# Patient Record
Sex: MALE | Race: WHITE | NOT HISPANIC OR LATINO | Employment: PART TIME | URBAN - METROPOLITAN AREA
[De-identification: names, ages, dates, MRNs, and addresses within clinical notes are randomized per-mention and may not be internally consistent; named-entity substitution may affect disease eponyms.]

---

## 2017-01-13 DIAGNOSIS — Z00.00 PREVENTATIVE HEALTH CARE: ICD-10-CM

## 2017-02-12 DIAGNOSIS — R79.89 LOW TESTOSTERONE LEVEL IN MALE: ICD-10-CM

## 2018-04-13 ENCOUNTER — OFFICE VISIT (OUTPATIENT)
Dept: MEDICAL GROUP | Facility: MEDICAL CENTER | Age: 43
End: 2018-04-13

## 2018-04-13 VITALS
DIASTOLIC BLOOD PRESSURE: 88 MMHG | HEIGHT: 68 IN | OXYGEN SATURATION: 95 % | WEIGHT: 234 LBS | TEMPERATURE: 98.4 F | BODY MASS INDEX: 35.46 KG/M2 | HEART RATE: 96 BPM | SYSTOLIC BLOOD PRESSURE: 122 MMHG

## 2018-04-13 DIAGNOSIS — E66.9 OBESITY (BMI 35.0-39.9 WITHOUT COMORBIDITY): ICD-10-CM

## 2018-04-13 DIAGNOSIS — H93.12 TINNITUS OF LEFT EAR: ICD-10-CM

## 2018-04-13 PROCEDURE — 99213 OFFICE O/P EST LOW 20 MIN: CPT | Performed by: PHYSICIAN ASSISTANT

## 2018-04-13 ASSESSMENT — PATIENT HEALTH QUESTIONNAIRE - PHQ9: CLINICAL INTERPRETATION OF PHQ2 SCORE: 0

## 2018-04-13 NOTE — ASSESSMENT & PLAN NOTE
This is a 42 old male complains of a one-week history of left ear ringing. States that he was in [place] and he turned over in bed and the ringing developed. Has had issues with hearing. Denies any vertigo. Denies any dizziness. Has had some slight nasal congestion. Since Friday he has been taking an antihistamine and a nasal decongestant. Also took Mucinex. Symptoms are not improving. He states for his job he needs to hear.

## 2018-04-13 NOTE — PROGRESS NOTES
"Subjective:   Antonio Hutson is a 42 y.o. male here today for left ear ringing.    Tinnitus of left ear  This is a 42 old male complains of a one-week history of left ear ringing. States that he was in [place] and he turned over in bed and the ringing developed. Has had issues with hearing. Denies any vertigo. Denies any dizziness. Has had some slight nasal congestion. Since Friday he has been taking an antihistamine and a nasal decongestant. Also took Mucinex. Symptoms are not improving. He states for his job he needs to hear.       Current medicines (including changes today)  Current Outpatient Prescriptions   Medication Sig Dispense Refill   • fexofenadine (ALLEGRA) 180 MG tablet Take 1 Tab by mouth every day. 30 Tab 3   • esomeprazole (NEXIUM) 20 MG capsule Take 1 Cap by mouth every day. 1/2 HOUR PRIOR TO SAME MEAL DAILY 30 Cap 5     No current facility-administered medications for this visit.      He  has no past medical history on file.    Social History and Family History were reviewed and updated.    ROS   No chest pain, no shortness of breath, no abdominal pain and all other systems were reviewed and are negative.       Objective:     Blood pressure 142/100, pulse 96, temperature 36.9 °C (98.4 °F), height 1.727 m (5' 8\"), weight 106.1 kg (234 lb), SpO2 95 %. Body mass index is 35.58 kg/m².   Physical Exam:  Constitutional: Alert, no distress.  Skin: Warm, dry, good turgor, no rashes in visible areas.  Eye: Equal, round and reactive, conjunctiva clear, lids normal.  ENMT: Lips without lesions, good dentition, oropharynx clear. Bilateral TMs are clear. Erythematous lesion noticed in the canal of the left ear.   Neck: Trachea midline, no masses.   Lymph: No cervical or supraclavicular lymphadenopathy  Respiratory: Unlabored respiratory effort, lungs clear to auscultation, no wheezes, no ronchi.  Cardiovascular: Normal S1, S2, no murmur, no edema.  Abdomen: Soft, non-tender, no masses.  Psych: Alert and " oriented x3, normal affect and mood.        Assessment and Plan:   The following treatment plan was discussed    1. Tinnitus of left ear  Acute, new onset condition. Symptoms likely secondary to ear congestion. Continue antihistamine. Perform Valsalva maneuver. Advised to stop using Q-tips. Refer to ENT in case symptoms do not improve.  - REFERRAL TO ENT    2. Obesity (BMI 35.0-39.9 without comorbidity)  Urged to exercise routinely and eat healthier. He will start the keto diet soon.  - Patient identified as having weight management issue.  Appropriate orders and counseling given.      Followup: Return if symptoms worsen or fail to improve.    Please note that this dictation was created using voice recognition software. I have made every reasonable attempt to correct obvious errors, but I expect that there are errors of grammar and possibly content that I did not discover before finalizing the note.

## 2018-12-07 ENCOUNTER — OFFICE VISIT (OUTPATIENT)
Dept: MEDICAL GROUP | Facility: MEDICAL CENTER | Age: 43
End: 2018-12-07
Payer: COMMERCIAL

## 2018-12-07 VITALS
HEIGHT: 68 IN | DIASTOLIC BLOOD PRESSURE: 82 MMHG | SYSTOLIC BLOOD PRESSURE: 134 MMHG | TEMPERATURE: 97.5 F | BODY MASS INDEX: 35.01 KG/M2 | HEART RATE: 91 BPM | WEIGHT: 231 LBS | OXYGEN SATURATION: 96 %

## 2018-12-07 DIAGNOSIS — J06.9 UPPER RESPIRATORY TRACT INFECTION, UNSPECIFIED TYPE: ICD-10-CM

## 2018-12-07 LAB
FLUAV+FLUBV AG SPEC QL IA: NEGATIVE
INT CON NEG: NEGATIVE
INT CON POS: POSITIVE

## 2018-12-07 PROCEDURE — 99214 OFFICE O/P EST MOD 30 MIN: CPT | Performed by: FAMILY MEDICINE

## 2018-12-07 PROCEDURE — 87804 INFLUENZA ASSAY W/OPTIC: CPT | Performed by: FAMILY MEDICINE

## 2018-12-07 NOTE — PROGRESS NOTES
"Patient normally sees Bhupendra Hurtado PA-C.  This is the first time I am seeing him.    HPI: 3 days of illness including: nasal congestion, rhinorrhea, sore throat, facial pain, cough, dyspnea, diarrhea, nausea, body aches, fatigue. Mucus is: green  Symptoms negative for fever, chills, vomiting   Similarly ill exposures: yes, co-worker was sick, believes one co-worker had pneumonia.  Medical history negative for recurrent OM, tonsillitis, sinusitis, asthma, bronchitis, pneumonia.   He  reports that he has never smoked. He has never used smokeless tobacco.  Taking Mucinex-DM, 12 hour nasal spray.  Yesterday was the worst.    ROS  No fever. + productive cough. No skin rashes.      Blood pressure 134/82, pulse 91, temperature 36.4 °C (97.5 °F), height 1.727 m (5' 8\"), weight 104.8 kg (231 lb), SpO2 96 %.  Gen: alert, No conversational dyspnea. No audible wheeze, nontoxic.  PERRL, conjunctiva slightly injected. No photophobia. No eye discharge.  Ears: normal pinnae. TM: normal  Nares patent with thick mucus.  Sinuses tender over maxillary / frontal sinuses  Throat: erythematous injection. No exudate.   Neck: supple, with  mildly enlarged cervical nodes  Lungs:  clear to auscultation, no wheezing, no retraction, no stridor, good air exchange.   Abdomen soft. No HSM.   Skin: warm and dry. No rash.    A/P    1. Upper respiratory tract infection, unspecified type  Negative influenza in clinic today.  Symptoms are most likely viral.  Discussed over-the-counter nasal decongestants for 3-5 days, discussed rebound congestion with long-term use.  Discussed rest and hydration.  If no improvement after 10 days, patient is to call for a prescription for amoxicillin.    Followup for worsening symptoms, difficulty breathing, lack of expected recovery, or should new symptoms or problems arise.    "

## 2018-12-13 ENCOUNTER — APPOINTMENT (OUTPATIENT)
Dept: MEDICAL GROUP | Facility: MEDICAL CENTER | Age: 43
End: 2018-12-13
Payer: COMMERCIAL

## 2019-03-18 ENCOUNTER — HOSPITAL ENCOUNTER (EMERGENCY)
Facility: MEDICAL CENTER | Age: 44
End: 2019-03-18
Attending: EMERGENCY MEDICINE
Payer: COMMERCIAL

## 2019-03-18 VITALS
HEIGHT: 68 IN | TEMPERATURE: 98.4 F | RESPIRATION RATE: 18 BRPM | OXYGEN SATURATION: 94 % | HEART RATE: 95 BPM | DIASTOLIC BLOOD PRESSURE: 94 MMHG | WEIGHT: 232.81 LBS | SYSTOLIC BLOOD PRESSURE: 127 MMHG | BODY MASS INDEX: 35.28 KG/M2

## 2019-03-18 DIAGNOSIS — R11.10 VOMITING AND DIARRHEA: ICD-10-CM

## 2019-03-18 DIAGNOSIS — R19.7 VOMITING AND DIARRHEA: ICD-10-CM

## 2019-03-18 LAB
ALBUMIN SERPL BCP-MCNC: 4.7 G/DL (ref 3.2–4.9)
ALBUMIN/GLOB SERPL: 1.4 G/DL
ALP SERPL-CCNC: 89 U/L (ref 30–99)
ALT SERPL-CCNC: 54 U/L (ref 2–50)
ANION GAP SERPL CALC-SCNC: 10 MMOL/L (ref 0–11.9)
APPEARANCE UR: CLEAR
AST SERPL-CCNC: 23 U/L (ref 12–45)
BASOPHILS # BLD AUTO: 0.2 % (ref 0–1.8)
BASOPHILS # BLD: 0.04 K/UL (ref 0–0.12)
BILIRUB SERPL-MCNC: 0.8 MG/DL (ref 0.1–1.5)
BILIRUB UR QL STRIP.AUTO: NEGATIVE
BUN SERPL-MCNC: 15 MG/DL (ref 8–22)
CALCIUM SERPL-MCNC: 9.2 MG/DL (ref 8.4–10.2)
CHLORIDE SERPL-SCNC: 99 MMOL/L (ref 96–112)
CO2 SERPL-SCNC: 25 MMOL/L (ref 20–33)
COLOR UR: YELLOW
CREAT SERPL-MCNC: 1.03 MG/DL (ref 0.5–1.4)
EOSINOPHIL # BLD AUTO: 0.03 K/UL (ref 0–0.51)
EOSINOPHIL NFR BLD: 0.2 % (ref 0–6.9)
ERYTHROCYTE [DISTWIDTH] IN BLOOD BY AUTOMATED COUNT: 39.5 FL (ref 35.9–50)
GLOBULIN SER CALC-MCNC: 3.3 G/DL (ref 1.9–3.5)
GLUCOSE SERPL-MCNC: 114 MG/DL (ref 65–99)
GLUCOSE UR STRIP.AUTO-MCNC: NEGATIVE MG/DL
HCT VFR BLD AUTO: 50 % (ref 42–52)
HGB BLD-MCNC: 16.6 G/DL (ref 14–18)
IMM GRANULOCYTES # BLD AUTO: 0.11 K/UL (ref 0–0.11)
IMM GRANULOCYTES NFR BLD AUTO: 0.6 % (ref 0–0.9)
KETONES UR STRIP.AUTO-MCNC: >=80 MG/DL
LEUKOCYTE ESTERASE UR QL STRIP.AUTO: NEGATIVE
LIPASE SERPL-CCNC: 27 U/L (ref 7–58)
LYMPHOCYTES # BLD AUTO: 0.28 K/UL (ref 1–4.8)
LYMPHOCYTES NFR BLD: 1.6 % (ref 22–41)
MCH RBC QN AUTO: 28.4 PG (ref 27–33)
MCHC RBC AUTO-ENTMCNC: 33.2 G/DL (ref 33.7–35.3)
MCV RBC AUTO: 85.6 FL (ref 81.4–97.8)
MICRO URNS: ABNORMAL
MONOCYTES # BLD AUTO: 0.36 K/UL (ref 0–0.85)
MONOCYTES NFR BLD AUTO: 2 % (ref 0–13.4)
NEUTROPHILS # BLD AUTO: 16.89 K/UL (ref 1.82–7.42)
NEUTROPHILS NFR BLD: 95.4 % (ref 44–72)
NITRITE UR QL STRIP.AUTO: NEGATIVE
NRBC # BLD AUTO: 0 K/UL
NRBC BLD-RTO: 0 /100 WBC
PH UR STRIP.AUTO: 5.5 [PH]
PLATELET # BLD AUTO: 282 K/UL (ref 164–446)
PMV BLD AUTO: 10 FL (ref 9–12.9)
POTASSIUM SERPL-SCNC: 4.3 MMOL/L (ref 3.6–5.5)
PROT SERPL-MCNC: 8 G/DL (ref 6–8.2)
PROT UR QL STRIP: NEGATIVE MG/DL
RBC # BLD AUTO: 5.84 M/UL (ref 4.7–6.1)
RBC UR QL AUTO: NEGATIVE
SODIUM SERPL-SCNC: 134 MMOL/L (ref 135–145)
SP GR UR REFRACTOMETRY: 1.03
WBC # BLD AUTO: 17.7 K/UL (ref 4.8–10.8)

## 2019-03-18 PROCEDURE — 96374 THER/PROPH/DIAG INJ IV PUSH: CPT

## 2019-03-18 PROCEDURE — 80053 COMPREHEN METABOLIC PANEL: CPT

## 2019-03-18 PROCEDURE — 81003 URINALYSIS AUTO W/O SCOPE: CPT

## 2019-03-18 PROCEDURE — 85025 COMPLETE CBC W/AUTO DIFF WBC: CPT

## 2019-03-18 PROCEDURE — 700105 HCHG RX REV CODE 258: Performed by: EMERGENCY MEDICINE

## 2019-03-18 PROCEDURE — 99285 EMERGENCY DEPT VISIT HI MDM: CPT

## 2019-03-18 PROCEDURE — 36415 COLL VENOUS BLD VENIPUNCTURE: CPT

## 2019-03-18 PROCEDURE — 83690 ASSAY OF LIPASE: CPT

## 2019-03-18 PROCEDURE — 700111 HCHG RX REV CODE 636 W/ 250 OVERRIDE (IP)

## 2019-03-18 PROCEDURE — 700111 HCHG RX REV CODE 636 W/ 250 OVERRIDE (IP): Performed by: EMERGENCY MEDICINE

## 2019-03-18 RX ORDER — ONDANSETRON 2 MG/ML
4 INJECTION INTRAMUSCULAR; INTRAVENOUS ONCE
Status: COMPLETED | OUTPATIENT
Start: 2019-03-18 | End: 2019-03-18

## 2019-03-18 RX ORDER — KETOROLAC TROMETHAMINE 30 MG/ML
30 INJECTION, SOLUTION INTRAMUSCULAR; INTRAVENOUS ONCE
Status: COMPLETED | OUTPATIENT
Start: 2019-03-18 | End: 2019-03-18

## 2019-03-18 RX ORDER — SODIUM CHLORIDE 9 MG/ML
1000 INJECTION, SOLUTION INTRAVENOUS ONCE
Status: COMPLETED | OUTPATIENT
Start: 2019-03-18 | End: 2019-03-18

## 2019-03-18 RX ORDER — ONDANSETRON 4 MG/1
4 TABLET, ORALLY DISINTEGRATING ORAL EVERY 8 HOURS PRN
Qty: 20 TAB | Refills: 0 | Status: SHIPPED | OUTPATIENT
Start: 2019-03-18 | End: 2022-07-10

## 2019-03-18 RX ORDER — KETOROLAC TROMETHAMINE 30 MG/ML
INJECTION, SOLUTION INTRAMUSCULAR; INTRAVENOUS
Status: COMPLETED
Start: 2019-03-18 | End: 2019-03-18

## 2019-03-18 RX ADMIN — KETOROLAC TROMETHAMINE 30 MG: 30 INJECTION, SOLUTION INTRAMUSCULAR; INTRAVENOUS at 21:22

## 2019-03-18 RX ADMIN — SODIUM CHLORIDE 1000 ML: 9 INJECTION, SOLUTION INTRAVENOUS at 21:23

## 2019-03-18 RX ADMIN — ONDANSETRON 4 MG: 2 INJECTION INTRAMUSCULAR; INTRAVENOUS at 21:23

## 2019-03-18 NOTE — ED TRIAGE NOTES
Patient BiB wife for generalized ABD pain and n/v/d since this morning. Patient describes pain as burning. He's taken his nexium as well as pepto bismal without any relief.

## 2019-03-19 NOTE — ED NOTES
Rounded on patient.  Patient was eating some bites of a sand which.  Advised to refrain from eating until ERP clears PO intake.

## 2019-03-19 NOTE — ED PROVIDER NOTES
"ED Provider Note    CHIEF COMPLAINT  Chief Complaint   Patient presents with   • Nausea/Vomiting/Diarrhea         HPI  Antonio Hutson is a 43 y.o. male who presents to the ED [facility] nausea vomiting diarrhea.  The patient, no black stools.  The patient does have diffuse pain throughout the body myalgias no fevers.  Does have a burning sensation in his stomach when he vomits.  Nonspecific abdominal pain.  No sick contacts, no new or different foods, no foreign travel.  No runny nose, cough, congestion.  The patient had a similar episode of nausea vomiting diarrhea last week and it went away after 3 hours.    REVIEW OF SYSTEMS  See HPI for further details. All other systems are negative.     PAST MEDICAL HISTORY  Past Medical History:   Diagnosis Date   • GERD (gastroesophageal reflux disease)        FAMILY HISTORY  No family history on file.    SOCIAL HISTORY  Social History     Social History   • Marital status: Single     Spouse name: N/A   • Number of children: N/A   • Years of education: N/A     Social History Main Topics   • Smoking status: Never Smoker   • Smokeless tobacco: Never Used   • Alcohol use Yes      Comment: occ   • Drug use: No   • Sexual activity: Yes     Partners: Female     Birth control/ protection: Surgical     Other Topics Concern   • Not on file     Social History Narrative   • No narrative on file       SURGICAL HISTORY  No past surgical history on file.    CURRENT MEDICATIONS  Home Medications    **Home medications have not yet been reviewed for this encounter**         ALLERGIES  Allergies   Allergen Reactions   • Nkda [No Known Drug Allergy]        PHYSICAL EXAM  VITAL SIGNS: /94   Pulse 95   Temp 36.9 °C (98.4 °F) (Temporal)   Resp 18   Ht 1.727 m (5' 8\")   Wt 105.6 kg (232 lb 12.9 oz)   SpO2 94%   BMI 35.40 kg/m²   Constitutional: Well developed, Well nourished, mild distress, Non-toxic appearance.   HENT: Normocephalic, Atraumatic, Bilateral external ears normal, " Oropharynx dry, No oral exudates, Nose normal.   Eyes: PERRLA, EOMI, Conjunctiva normal, No discharge.   Neck: Normal range of motion, No tenderness, Supple, No stridor.   Lymphatic: No lymphadenopathy noted.   Cardiovascular: Normal heart rate, Normal rhythm.   Thorax & Lungs: Normal breath sounds, No respiratory distress, No wheezing, No chest tenderness.   Abdomen: Obese, soft, nontender, no rebound, no peritoneal signs  Skin: Warm, Dry, No erythema, No rash.   Back: No tenderness, No CVA tenderness.   Extremities: Intact distal pulses, No edema, No tenderness.   Neurologic: Alert & oriented x 3, moves all extremities spontaneously.     COURSE & MEDICAL DECISION MAKING  Pertinent Labs & Imaging studies reviewed. (See chart for details)  Patient with nausea vomiting diarrhea, laboratory tests are reviewed Toradol.  IV fluids were for dry mucous membranes, vomiting, inability to tolerate orals.  Patient is feeling improved after IV fluids, Toradol and Zofran.  We will discharge the patient home, the patient has a repeat abdominal examination is benign.  The patient will return with worsening symptoms.  HYDRATION: Based on the patient's presentation of Acute Vomiting the patient was given IV fluids. IV Hydration was used because oral hydration was not adequate alone. Upon recheck following hydration, the patient was Improved.    FINAL IMPRESSION  1. Vomiting and diarrhea        Patient referred to primary care provider for blood pressure management    This dictation was created using voice recognition software. The accuracy of the dictation is limited to the abilities of the software. I expect there may be some errors of grammar and possibly content. The nursing notes were reviewed and certain aspects of this information were incorporated into this note.    Electronically signed by: Arpit Scott, 3/18/2019 9:10 PM

## 2019-03-19 NOTE — ED NOTES
Discharge information reviewed in detail. Patient verbalized understanding of discharge instructions to follow up with PCP and to return to ER if condition worsens.   Patient educated on one new prescription(s).  Patient ambulated out of ER in a steady gait.

## 2021-04-14 ENCOUNTER — IMMUNIZATION (OUTPATIENT)
Dept: FAMILY PLANNING/WOMEN'S HEALTH CLINIC | Facility: IMMUNIZATION CENTER | Age: 46
End: 2021-04-14
Payer: OTHER GOVERNMENT

## 2021-04-14 DIAGNOSIS — Z23 ENCOUNTER FOR VACCINATION: Primary | ICD-10-CM

## 2021-04-14 PROCEDURE — 91300 PFIZER SARS-COV-2 VACCINE: CPT

## 2021-04-14 PROCEDURE — 0001A PFIZER SARS-COV-2 VACCINE: CPT

## 2021-05-07 ENCOUNTER — IMMUNIZATION (OUTPATIENT)
Dept: FAMILY PLANNING/WOMEN'S HEALTH CLINIC | Facility: IMMUNIZATION CENTER | Age: 46
End: 2021-05-07
Payer: OTHER GOVERNMENT

## 2021-05-07 DIAGNOSIS — Z23 ENCOUNTER FOR VACCINATION: Primary | ICD-10-CM

## 2021-05-07 PROCEDURE — 91300 PFIZER SARS-COV-2 VACCINE: CPT | Performed by: INTERNAL MEDICINE

## 2021-05-07 PROCEDURE — 0002A PFIZER SARS-COV-2 VACCINE: CPT | Performed by: INTERNAL MEDICINE

## 2022-07-09 ENCOUNTER — HOSPITAL ENCOUNTER (EMERGENCY)
Facility: MEDICAL CENTER | Age: 47
End: 2022-07-09
Attending: EMERGENCY MEDICINE
Payer: COMMERCIAL

## 2022-07-09 VITALS
WEIGHT: 233.69 LBS | RESPIRATION RATE: 16 BRPM | BODY MASS INDEX: 35.42 KG/M2 | HEART RATE: 102 BPM | HEIGHT: 68 IN | SYSTOLIC BLOOD PRESSURE: 139 MMHG | OXYGEN SATURATION: 95 % | TEMPERATURE: 99.9 F | DIASTOLIC BLOOD PRESSURE: 93 MMHG

## 2022-07-09 DIAGNOSIS — R22.0 FACIAL SWELLING: ICD-10-CM

## 2022-07-09 PROCEDURE — 700102 HCHG RX REV CODE 250 W/ 637 OVERRIDE(OP): Performed by: EMERGENCY MEDICINE

## 2022-07-09 PROCEDURE — 99283 EMERGENCY DEPT VISIT LOW MDM: CPT

## 2022-07-09 PROCEDURE — U0003 INFECTIOUS AGENT DETECTION BY NUCLEIC ACID (DNA OR RNA); SEVERE ACUTE RESPIRATORY SYNDROME CORONAVIRUS 2 (SARS-COV-2) (CORONAVIRUS DISEASE [COVID-19]), AMPLIFIED PROBE TECHNIQUE, MAKING USE OF HIGH THROUGHPUT TECHNOLOGIES AS DESCRIBED BY CMS-2020-01-R: HCPCS

## 2022-07-09 PROCEDURE — U0005 INFEC AGEN DETEC AMPLI PROBE: HCPCS

## 2022-07-09 PROCEDURE — A9270 NON-COVERED ITEM OR SERVICE: HCPCS | Performed by: EMERGENCY MEDICINE

## 2022-07-09 RX ORDER — OXYCODONE HYDROCHLORIDE AND ACETAMINOPHEN 5; 325 MG/1; MG/1
1 TABLET ORAL ONCE
Status: COMPLETED | OUTPATIENT
Start: 2022-07-09 | End: 2022-07-09

## 2022-07-09 RX ORDER — AMOXICILLIN AND CLAVULANATE POTASSIUM 875; 125 MG/1; MG/1
1 TABLET, FILM COATED ORAL 2 TIMES DAILY
Qty: 20 TABLET | Refills: 0 | Status: SHIPPED | OUTPATIENT
Start: 2022-07-09 | End: 2022-07-19

## 2022-07-09 RX ORDER — IBUPROFEN 600 MG/1
600 TABLET ORAL ONCE
Status: COMPLETED | OUTPATIENT
Start: 2022-07-09 | End: 2022-07-09

## 2022-07-09 RX ORDER — OXYCODONE HYDROCHLORIDE AND ACETAMINOPHEN 5; 325 MG/1; MG/1
1-2 TABLET ORAL EVERY 6 HOURS PRN
Qty: 10 TABLET | Refills: 0 | Status: SHIPPED | OUTPATIENT
Start: 2022-07-09 | End: 2022-07-12

## 2022-07-09 RX ORDER — AMOXICILLIN AND CLAVULANATE POTASSIUM 875; 125 MG/1; MG/1
1 TABLET, FILM COATED ORAL ONCE
Status: COMPLETED | OUTPATIENT
Start: 2022-07-09 | End: 2022-07-09

## 2022-07-09 RX ADMIN — IBUPROFEN 600 MG: 600 TABLET ORAL at 16:43

## 2022-07-09 RX ADMIN — AMOXICILLIN AND CLAVULANATE POTASSIUM 1 TABLET: 875; 125 TABLET, FILM COATED ORAL at 16:21

## 2022-07-09 RX ADMIN — OXYCODONE AND ACETAMINOPHEN 1 TABLET: 5; 325 TABLET ORAL at 16:43

## 2022-07-09 NOTE — ED NOTES
Pt and wife given dischg instructions  Verbally understands  Aware that Rx's augmentin and percocet were sent to listed pharmacy  They will go and   Pt aware to take as directed  D/c'ed to home in NAD  To return for worsening symptoms  To f/u w/ dentist the coming week

## 2022-07-09 NOTE — DISCHARGE INSTRUCTIONS
You can take 1 g of Tylenol every 6 hours for pain if needed  If that is not enough you can take 600 mg of ibuprofen/Motrin as well every 6 hours.  I recommend that you take Tylenol wait 3 hours then take Motrin wait 3 hours then take another dose of Tylenol as needed    Please return to the ER if the facial swelling gets worse    If it is unchanged or not getting better I would recommend seeing the oral surgeon on Monday or Tuesday for another evaluation.

## 2022-07-09 NOTE — ED PROVIDER NOTES
"ED Provider Note    CHIEF COMPLAINT  Chief Complaint   Patient presents with   • Facial Swelling   • Dental Pain       HPI  Atnonio Hutson is a 46 y.o. male who presents facial swelling.  Dental pain.  Duration 1 day.  Right upper incisor and premolar.  It is actually in front of it and above near the sinus.  He describes it as painful.  Associate with swelling.  Nothing makes it better except for some slight cough cold medicine.  Nothing makes it worse.  It is not associate any gingival swelling no dental fractures by anything.  No fever chills no shortness of breath.  He does feel general malaise and chills.  He feels weak.  He is triple vaccinated.    REVIEW OF SYSTEMS  See above    PAST MEDICAL HISTORY  Past Medical History:   Diagnosis Date   • GERD (gastroesophageal reflux disease)        FAMILY HISTORY  History reviewed. No pertinent family history.    SOCIAL HISTORY  Social History     Socioeconomic History   • Marital status:    Tobacco Use   • Smoking status: Never Smoker   • Smokeless tobacco: Never Used   Substance and Sexual Activity   • Alcohol use: Yes     Comment: Occasionally   • Drug use: No   • Sexual activity: Yes     Partners: Female     Birth control/protection: Surgical       SURGICAL HISTORY  History reviewed. No pertinent surgical history.    CURRENT MEDICATIONS  Home Medications    **Home medications have not yet been reviewed for this encounter**          ALLERGIES  Allergies   Allergen Reactions   • Nkda [No Known Drug Allergy]        PHYSICAL EXAM  VITAL SIGNS: /81   Pulse 75   Temp 36.1 °C (97 °F) (Temporal)   Resp 20   Ht 1.727 m (5' 8\")   Wt 106 kg (233 lb 11 oz)   SpO2 97%   BMI 35.53 kg/m²   Constitutional: Well developed, Well nourished, No acute distress, Non-toxic appearance.   HENT: Normocephalic, Atraumatic, patient appears to have some slight loss of the nasolabial fold on the right.  There appears to tenderness in the buccal mucosa up in that area " did not find any large stones or salivary abnormality.  There is no pain to palpation and needed teeth.  There is filling on the premolar in the right upper area.  Minimal lower maxillary sinus tenderness.  Tympanic membranes are clear bilaterally.  Cardiovascular: Tachycardia noted.  Thorax & Lungs: Respiratory distress no stridor.  Skin: Facial redness noted.  Some minimal erythema on the buccal mucosa    RADIOLOGY/PROCEDURES  Augmentin given    COURSE & MEDICAL DECISION MAKING  Pertinent Labs & Imaging studies reviewed. (See chart for details)  Either skin infection near the near the buccal mucosa/gingival border versus early dental infection.  Associated general malaise any the patient is septic the patient may have also concurrent COVID or other symptoms    Plan  Augmentin  Close oral surgery follow-up  Return to the ER symptoms worsen.  COVID testing the patient will check 24 hours later with MyChart    FINAL IMPRESSION  1.  Facial swelling  2.   3.      Electronically signed by: Del Ramirez M.D., 7/9/2022 4:06 PM

## 2022-07-09 NOTE — ED TRIAGE NOTES
"Presents complaining of right upper molar pain/abscess worsening since yesterday with associated episodic nausea, and vomiting experienced this afternoon in triage.   Chief Complaint   Patient presents with   • Facial Swelling   • Dental Pain     /81   Pulse 75   Temp 36.1 °C (97 °F) (Temporal)   Resp 20   Ht 1.727 m (5' 8\")   Wt 106 kg (233 lb 11 oz)   SpO2 97%   BMI 35.53 kg/m²   Has this patient been vaccinated for COVID YES  If not, would they like to be vaccinated while in the ER if eligible?  N/A  Would the patient like to speak with the ERP about the possibility of receiving the COVID vaccine today before making a decision? N/A     "

## 2022-07-10 ENCOUNTER — ANESTHESIA (OUTPATIENT)
Dept: SURGERY | Facility: MEDICAL CENTER | Age: 47
DRG: 872 | End: 2022-07-10
Payer: COMMERCIAL

## 2022-07-10 ENCOUNTER — APPOINTMENT (OUTPATIENT)
Dept: RADIOLOGY | Facility: MEDICAL CENTER | Age: 47
DRG: 872 | End: 2022-07-10
Attending: EMERGENCY MEDICINE
Payer: COMMERCIAL

## 2022-07-10 ENCOUNTER — HOSPITAL ENCOUNTER (INPATIENT)
Facility: MEDICAL CENTER | Age: 47
LOS: 1 days | DRG: 872 | End: 2022-07-11
Attending: EMERGENCY MEDICINE | Admitting: HOSPITALIST
Payer: COMMERCIAL

## 2022-07-10 ENCOUNTER — ANESTHESIA EVENT (OUTPATIENT)
Dept: SURGERY | Facility: MEDICAL CENTER | Age: 47
DRG: 872 | End: 2022-07-10
Payer: COMMERCIAL

## 2022-07-10 DIAGNOSIS — K04.7 DENTAL ABSCESS: ICD-10-CM

## 2022-07-10 PROBLEM — E87.6 HYPOKALEMIA: Status: ACTIVE | Noted: 2022-07-10

## 2022-07-10 PROBLEM — L03.90 CELLULITIS: Status: ACTIVE | Noted: 2022-07-10

## 2022-07-10 PROBLEM — A41.9 SEPSIS (HCC): Status: ACTIVE | Noted: 2022-07-10

## 2022-07-10 PROBLEM — E87.1 HYPONATREMIA: Status: ACTIVE | Noted: 2022-07-10

## 2022-07-10 PROBLEM — E86.0 DEHYDRATION: Status: ACTIVE | Noted: 2022-07-10

## 2022-07-10 PROBLEM — R73.9 HYPERGLYCEMIA: Status: ACTIVE | Noted: 2022-07-10

## 2022-07-10 LAB
ALBUMIN SERPL BCP-MCNC: 4.2 G/DL (ref 3.2–4.9)
ALBUMIN/GLOB SERPL: 1.4 G/DL
ALP SERPL-CCNC: 94 U/L (ref 30–99)
ALT SERPL-CCNC: 53 U/L (ref 2–50)
ANION GAP SERPL CALC-SCNC: 13 MMOL/L (ref 7–16)
AST SERPL-CCNC: 39 U/L (ref 12–45)
BASOPHILS # BLD AUTO: 0.2 % (ref 0–1.8)
BASOPHILS # BLD: 0.03 K/UL (ref 0–0.12)
BILIRUB SERPL-MCNC: 0.8 MG/DL (ref 0.1–1.5)
BUN SERPL-MCNC: 7 MG/DL (ref 8–22)
CALCIUM SERPL-MCNC: 8.9 MG/DL (ref 8.4–10.2)
CHLORIDE SERPL-SCNC: 100 MMOL/L (ref 96–112)
CO2 SERPL-SCNC: 20 MMOL/L (ref 20–33)
CREAT SERPL-MCNC: 0.8 MG/DL (ref 0.5–1.4)
EOSINOPHIL # BLD AUTO: 0.02 K/UL (ref 0–0.51)
EOSINOPHIL NFR BLD: 0.2 % (ref 0–6.9)
ERYTHROCYTE [DISTWIDTH] IN BLOOD BY AUTOMATED COUNT: 41.4 FL (ref 35.9–50)
GFR SERPLBLD CREATININE-BSD FMLA CKD-EPI: 109 ML/MIN/1.73 M 2
GLOBULIN SER CALC-MCNC: 2.9 G/DL (ref 1.9–3.5)
GLUCOSE SERPL-MCNC: 149 MG/DL (ref 65–99)
HCT VFR BLD AUTO: 44.6 % (ref 42–52)
HGB BLD-MCNC: 15 G/DL (ref 14–18)
IMM GRANULOCYTES # BLD AUTO: 0.06 K/UL (ref 0–0.11)
IMM GRANULOCYTES NFR BLD AUTO: 0.5 % (ref 0–0.9)
LACTATE BLD-SCNC: 1.4 MMOL/L (ref 0.5–2)
LYMPHOCYTES # BLD AUTO: 0.61 K/UL (ref 1–4.8)
LYMPHOCYTES NFR BLD: 4.9 % (ref 22–41)
MCH RBC QN AUTO: 28.6 PG (ref 27–33)
MCHC RBC AUTO-ENTMCNC: 33.6 G/DL (ref 33.7–35.3)
MCV RBC AUTO: 85 FL (ref 81.4–97.8)
MONOCYTES # BLD AUTO: 0.48 K/UL (ref 0–0.85)
MONOCYTES NFR BLD AUTO: 3.8 % (ref 0–13.4)
NEUTROPHILS # BLD AUTO: 11.3 K/UL (ref 1.82–7.42)
NEUTROPHILS NFR BLD: 90.4 % (ref 44–72)
NRBC # BLD AUTO: 0 K/UL
NRBC BLD-RTO: 0 /100 WBC
PLATELET # BLD AUTO: 201 K/UL (ref 164–446)
PMV BLD AUTO: 10.4 FL (ref 9–12.9)
POTASSIUM SERPL-SCNC: 3.3 MMOL/L (ref 3.6–5.5)
PROT SERPL-MCNC: 7.1 G/DL (ref 6–8.2)
RBC # BLD AUTO: 5.25 M/UL (ref 4.7–6.1)
SARS-COV-2 RNA RESP QL NAA+PROBE: NOTDETECTED
SODIUM SERPL-SCNC: 133 MMOL/L (ref 135–145)
SPECIMEN SOURCE: NORMAL
WBC # BLD AUTO: 12.5 K/UL (ref 4.8–10.8)

## 2022-07-10 PROCEDURE — 0CDWXZ0 EXTRACTION OF UPPER TOOTH, SINGLE, EXTERNAL APPROACH: ICD-10-PCS | Performed by: DENTIST

## 2022-07-10 PROCEDURE — 700105 HCHG RX REV CODE 258: Performed by: EMERGENCY MEDICINE

## 2022-07-10 PROCEDURE — 83605 ASSAY OF LACTIC ACID: CPT

## 2022-07-10 PROCEDURE — 700117 HCHG RX CONTRAST REV CODE 255: Performed by: EMERGENCY MEDICINE

## 2022-07-10 PROCEDURE — 700102 HCHG RX REV CODE 250 W/ 637 OVERRIDE(OP): Performed by: DENTIST

## 2022-07-10 PROCEDURE — 700111 HCHG RX REV CODE 636 W/ 250 OVERRIDE (IP): Performed by: HOSPITALIST

## 2022-07-10 PROCEDURE — A9270 NON-COVERED ITEM OR SERVICE: HCPCS | Performed by: HOSPITALIST

## 2022-07-10 PROCEDURE — 700102 HCHG RX REV CODE 250 W/ 637 OVERRIDE(OP): Performed by: HOSPITALIST

## 2022-07-10 PROCEDURE — 96375 TX/PRO/DX INJ NEW DRUG ADDON: CPT

## 2022-07-10 PROCEDURE — 700101 HCHG RX REV CODE 250: Performed by: DENTIST

## 2022-07-10 PROCEDURE — 83036 HEMOGLOBIN GLYCOSYLATED A1C: CPT

## 2022-07-10 PROCEDURE — 96374 THER/PROPH/DIAG INJ IV PUSH: CPT

## 2022-07-10 PROCEDURE — 160009 HCHG ANES TIME/MIN: Performed by: DENTIST

## 2022-07-10 PROCEDURE — 700101 HCHG RX REV CODE 250: Performed by: ANESTHESIOLOGY

## 2022-07-10 PROCEDURE — 770006 HCHG ROOM/CARE - MED/SURG/GYN SEMI*

## 2022-07-10 PROCEDURE — 700105 HCHG RX REV CODE 258: Performed by: DENTIST

## 2022-07-10 PROCEDURE — 99140 ANES COMP EMERGENCY COND: CPT | Performed by: ANESTHESIOLOGY

## 2022-07-10 PROCEDURE — 700111 HCHG RX REV CODE 636 W/ 250 OVERRIDE (IP): Performed by: ANESTHESIOLOGY

## 2022-07-10 PROCEDURE — 700111 HCHG RX REV CODE 636 W/ 250 OVERRIDE (IP): Performed by: EMERGENCY MEDICINE

## 2022-07-10 PROCEDURE — 70355 PANORAMIC X-RAY OF JAWS: CPT

## 2022-07-10 PROCEDURE — 700101 HCHG RX REV CODE 250: Performed by: HOSPITALIST

## 2022-07-10 PROCEDURE — 160002 HCHG RECOVERY MINUTES (STAT): Performed by: DENTIST

## 2022-07-10 PROCEDURE — 160027 HCHG SURGERY MINUTES - 1ST 30 MINS LEVEL 2: Performed by: DENTIST

## 2022-07-10 PROCEDURE — 00170 ANES INTRAORAL PX NOS: CPT | Performed by: ANESTHESIOLOGY

## 2022-07-10 PROCEDURE — 36415 COLL VENOUS BLD VENIPUNCTURE: CPT

## 2022-07-10 PROCEDURE — 160048 HCHG OR STATISTICAL LEVEL 1-5: Performed by: DENTIST

## 2022-07-10 PROCEDURE — 160035 HCHG PACU - 1ST 60 MINS PHASE I: Performed by: DENTIST

## 2022-07-10 PROCEDURE — 160036 HCHG PACU - EA ADDL 30 MINS PHASE I: Performed by: DENTIST

## 2022-07-10 PROCEDURE — 80053 COMPREHEN METABOLIC PANEL: CPT

## 2022-07-10 PROCEDURE — 85025 COMPLETE CBC W/AUTO DIFF WBC: CPT

## 2022-07-10 PROCEDURE — 99223 1ST HOSP IP/OBS HIGH 75: CPT | Performed by: HOSPITALIST

## 2022-07-10 PROCEDURE — 700105 HCHG RX REV CODE 258: Performed by: HOSPITALIST

## 2022-07-10 PROCEDURE — 70487 CT MAXILLOFACIAL W/DYE: CPT

## 2022-07-10 PROCEDURE — A9270 NON-COVERED ITEM OR SERVICE: HCPCS | Performed by: DENTIST

## 2022-07-10 PROCEDURE — 99291 CRITICAL CARE FIRST HOUR: CPT

## 2022-07-10 RX ORDER — LIDOCAINE HYDROCHLORIDE AND EPINEPHRINE BITARTRATE 20; .01 MG/ML; MG/ML
INJECTION, SOLUTION SUBCUTANEOUS
Status: DISCONTINUED | OUTPATIENT
Start: 2022-07-10 | End: 2022-07-10 | Stop reason: HOSPADM

## 2022-07-10 RX ORDER — SODIUM CHLORIDE, SODIUM LACTATE, POTASSIUM CHLORIDE, AND CALCIUM CHLORIDE .6; .31; .03; .02 G/100ML; G/100ML; G/100ML; G/100ML
30 INJECTION, SOLUTION INTRAVENOUS
Status: DISCONTINUED | OUTPATIENT
Start: 2022-07-10 | End: 2022-07-11 | Stop reason: HOSPADM

## 2022-07-10 RX ORDER — HYDROMORPHONE HYDROCHLORIDE 1 MG/ML
0.1 INJECTION, SOLUTION INTRAMUSCULAR; INTRAVENOUS; SUBCUTANEOUS
Status: DISCONTINUED | OUTPATIENT
Start: 2022-07-10 | End: 2022-07-10 | Stop reason: HOSPADM

## 2022-07-10 RX ORDER — HALOPERIDOL 5 MG/ML
1 INJECTION INTRAMUSCULAR
Status: DISCONTINUED | OUTPATIENT
Start: 2022-07-10 | End: 2022-07-10 | Stop reason: HOSPADM

## 2022-07-10 RX ORDER — ONDANSETRON 4 MG/1
4 TABLET, ORALLY DISINTEGRATING ORAL EVERY 4 HOURS PRN
Status: DISCONTINUED | OUTPATIENT
Start: 2022-07-10 | End: 2022-07-11 | Stop reason: HOSPADM

## 2022-07-10 RX ORDER — SODIUM CHLORIDE, SODIUM LACTATE, POTASSIUM CHLORIDE, CALCIUM CHLORIDE 600; 310; 30; 20 MG/100ML; MG/100ML; MG/100ML; MG/100ML
INJECTION, SOLUTION INTRAVENOUS CONTINUOUS
Status: DISCONTINUED | OUTPATIENT
Start: 2022-07-10 | End: 2022-07-10 | Stop reason: HOSPADM

## 2022-07-10 RX ORDER — MENTHOL, METHYL SALICYLATE 63; 210 MG/1; MG/1
1 PATCH PERCUTANEOUS; TOPICAL; TRANSDERMAL 2 TIMES DAILY PRN
COMMUNITY
End: 2022-08-12

## 2022-07-10 RX ORDER — OXYCODONE HYDROCHLORIDE 5 MG/1
2.5 TABLET ORAL
Status: DISCONTINUED | OUTPATIENT
Start: 2022-07-10 | End: 2022-07-11 | Stop reason: HOSPADM

## 2022-07-10 RX ORDER — ROCURONIUM BROMIDE 10 MG/ML
INJECTION, SOLUTION INTRAVENOUS PRN
Status: DISCONTINUED | OUTPATIENT
Start: 2022-07-10 | End: 2022-07-10 | Stop reason: SURG

## 2022-07-10 RX ORDER — HYDROMORPHONE HYDROCHLORIDE 1 MG/ML
0.2 INJECTION, SOLUTION INTRAMUSCULAR; INTRAVENOUS; SUBCUTANEOUS
Status: DISCONTINUED | OUTPATIENT
Start: 2022-07-10 | End: 2022-07-10 | Stop reason: HOSPADM

## 2022-07-10 RX ORDER — ACETAMINOPHEN 325 MG/1
650 TABLET ORAL EVERY 6 HOURS PRN
Status: DISCONTINUED | OUTPATIENT
Start: 2022-07-10 | End: 2022-07-11 | Stop reason: HOSPADM

## 2022-07-10 RX ORDER — ONDANSETRON 2 MG/ML
4 INJECTION INTRAMUSCULAR; INTRAVENOUS
Status: DISCONTINUED | OUTPATIENT
Start: 2022-07-10 | End: 2022-07-10 | Stop reason: HOSPADM

## 2022-07-10 RX ORDER — MEPERIDINE HYDROCHLORIDE 25 MG/ML
12.5 INJECTION INTRAMUSCULAR; INTRAVENOUS; SUBCUTANEOUS
Status: DISCONTINUED | OUTPATIENT
Start: 2022-07-10 | End: 2022-07-10 | Stop reason: HOSPADM

## 2022-07-10 RX ORDER — OXYCODONE HYDROCHLORIDE 5 MG/1
5 TABLET ORAL
Status: DISCONTINUED | OUTPATIENT
Start: 2022-07-10 | End: 2022-07-11 | Stop reason: HOSPADM

## 2022-07-10 RX ORDER — SODIUM CHLORIDE 9 MG/ML
1000 INJECTION, SOLUTION INTRAVENOUS ONCE
Status: COMPLETED | OUTPATIENT
Start: 2022-07-10 | End: 2022-07-10

## 2022-07-10 RX ORDER — HYDROMORPHONE HYDROCHLORIDE 1 MG/ML
0.4 INJECTION, SOLUTION INTRAMUSCULAR; INTRAVENOUS; SUBCUTANEOUS
Status: DISCONTINUED | OUTPATIENT
Start: 2022-07-10 | End: 2022-07-10 | Stop reason: HOSPADM

## 2022-07-10 RX ORDER — OMEPRAZOLE 20 MG/1
20 CAPSULE, DELAYED RELEASE ORAL DAILY
Status: DISCONTINUED | OUTPATIENT
Start: 2022-07-11 | End: 2022-07-11 | Stop reason: HOSPADM

## 2022-07-10 RX ORDER — PROCHLORPERAZINE EDISYLATE 5 MG/ML
5-10 INJECTION INTRAMUSCULAR; INTRAVENOUS EVERY 4 HOURS PRN
Status: DISCONTINUED | OUTPATIENT
Start: 2022-07-10 | End: 2022-07-11 | Stop reason: HOSPADM

## 2022-07-10 RX ORDER — LABETALOL HYDROCHLORIDE 5 MG/ML
5 INJECTION, SOLUTION INTRAVENOUS
Status: COMPLETED | OUTPATIENT
Start: 2022-07-10 | End: 2022-07-10

## 2022-07-10 RX ORDER — DIPHENHYDRAMINE HYDROCHLORIDE 50 MG/ML
12.5 INJECTION INTRAMUSCULAR; INTRAVENOUS
Status: DISCONTINUED | OUTPATIENT
Start: 2022-07-10 | End: 2022-07-10 | Stop reason: HOSPADM

## 2022-07-10 RX ORDER — DEXAMETHASONE SODIUM PHOSPHATE 4 MG/ML
4 INJECTION, SOLUTION INTRA-ARTICULAR; INTRALESIONAL; INTRAMUSCULAR; INTRAVENOUS; SOFT TISSUE ONCE
Status: COMPLETED | OUTPATIENT
Start: 2022-07-10 | End: 2022-07-10

## 2022-07-10 RX ORDER — DEXAMETHASONE SODIUM PHOSPHATE 4 MG/ML
INJECTION, SOLUTION INTRA-ARTICULAR; INTRALESIONAL; INTRAMUSCULAR; INTRAVENOUS; SOFT TISSUE PRN
Status: DISCONTINUED | OUTPATIENT
Start: 2022-07-10 | End: 2022-07-10 | Stop reason: SURG

## 2022-07-10 RX ORDER — CHLORHEXIDINE GLUCONATE ORAL RINSE 1.2 MG/ML
15 SOLUTION DENTAL 2 TIMES DAILY
Status: DISCONTINUED | OUTPATIENT
Start: 2022-07-10 | End: 2022-07-11 | Stop reason: HOSPADM

## 2022-07-10 RX ORDER — SENNOSIDES 8.6 MG
650 CAPSULE ORAL EVERY 6 HOURS PRN
COMMUNITY
End: 2022-08-12

## 2022-07-10 RX ORDER — PROMETHAZINE HYDROCHLORIDE 25 MG/1
12.5-25 SUPPOSITORY RECTAL EVERY 4 HOURS PRN
Status: DISCONTINUED | OUTPATIENT
Start: 2022-07-10 | End: 2022-07-11 | Stop reason: HOSPADM

## 2022-07-10 RX ORDER — HYDRALAZINE HYDROCHLORIDE 20 MG/ML
20 INJECTION INTRAMUSCULAR; INTRAVENOUS EVERY 6 HOURS PRN
Status: DISCONTINUED | OUTPATIENT
Start: 2022-07-10 | End: 2022-07-11 | Stop reason: HOSPADM

## 2022-07-10 RX ORDER — MIDAZOLAM HYDROCHLORIDE 1 MG/ML
1 INJECTION INTRAMUSCULAR; INTRAVENOUS
Status: DISCONTINUED | OUTPATIENT
Start: 2022-07-10 | End: 2022-07-10 | Stop reason: HOSPADM

## 2022-07-10 RX ORDER — ONDANSETRON 2 MG/ML
INJECTION INTRAMUSCULAR; INTRAVENOUS PRN
Status: DISCONTINUED | OUTPATIENT
Start: 2022-07-10 | End: 2022-07-10 | Stop reason: SURG

## 2022-07-10 RX ORDER — PROMETHAZINE HYDROCHLORIDE 25 MG/1
12.5-25 TABLET ORAL EVERY 4 HOURS PRN
Status: DISCONTINUED | OUTPATIENT
Start: 2022-07-10 | End: 2022-07-11 | Stop reason: HOSPADM

## 2022-07-10 RX ORDER — ONDANSETRON 2 MG/ML
4 INJECTION INTRAMUSCULAR; INTRAVENOUS EVERY 4 HOURS PRN
Status: DISCONTINUED | OUTPATIENT
Start: 2022-07-10 | End: 2022-07-11 | Stop reason: HOSPADM

## 2022-07-10 RX ORDER — SODIUM CHLORIDE AND POTASSIUM CHLORIDE 150; 900 MG/100ML; MG/100ML
INJECTION, SOLUTION INTRAVENOUS CONTINUOUS
Status: DISCONTINUED | OUTPATIENT
Start: 2022-07-10 | End: 2022-07-11 | Stop reason: HOSPADM

## 2022-07-10 RX ORDER — LIDOCAINE HYDROCHLORIDE 20 MG/ML
INJECTION, SOLUTION EPIDURAL; INFILTRATION; INTRACAUDAL; PERINEURAL PRN
Status: DISCONTINUED | OUTPATIENT
Start: 2022-07-10 | End: 2022-07-10 | Stop reason: SURG

## 2022-07-10 RX ORDER — BISACODYL 10 MG
10 SUPPOSITORY, RECTAL RECTAL
Status: DISCONTINUED | OUTPATIENT
Start: 2022-07-10 | End: 2022-07-11 | Stop reason: HOSPADM

## 2022-07-10 RX ORDER — SODIUM CHLORIDE, SODIUM LACTATE, POTASSIUM CHLORIDE, CALCIUM CHLORIDE 600; 310; 30; 20 MG/100ML; MG/100ML; MG/100ML; MG/100ML
INJECTION, SOLUTION INTRAVENOUS CONTINUOUS
Status: ACTIVE | OUTPATIENT
Start: 2022-07-10 | End: 2022-07-10

## 2022-07-10 RX ORDER — AMOXICILLIN 250 MG
2 CAPSULE ORAL 2 TIMES DAILY
Status: DISCONTINUED | OUTPATIENT
Start: 2022-07-10 | End: 2022-07-11 | Stop reason: HOSPADM

## 2022-07-10 RX ORDER — SODIUM CHLORIDE, SODIUM LACTATE, POTASSIUM CHLORIDE, AND CALCIUM CHLORIDE .6; .31; .03; .02 G/100ML; G/100ML; G/100ML; G/100ML
500 INJECTION, SOLUTION INTRAVENOUS
Status: DISCONTINUED | OUTPATIENT
Start: 2022-07-10 | End: 2022-07-11 | Stop reason: HOSPADM

## 2022-07-10 RX ORDER — OXYCODONE HCL 5 MG/5 ML
10 SOLUTION, ORAL ORAL
Status: DISCONTINUED | OUTPATIENT
Start: 2022-07-10 | End: 2022-07-10 | Stop reason: HOSPADM

## 2022-07-10 RX ORDER — ALBUTEROL SULFATE 2.5 MG/3ML
2.5 SOLUTION RESPIRATORY (INHALATION)
Status: DISCONTINUED | OUTPATIENT
Start: 2022-07-10 | End: 2022-07-10 | Stop reason: HOSPADM

## 2022-07-10 RX ORDER — HYDRALAZINE HYDROCHLORIDE 20 MG/ML
5 INJECTION INTRAMUSCULAR; INTRAVENOUS
Status: DISCONTINUED | OUTPATIENT
Start: 2022-07-10 | End: 2022-07-10 | Stop reason: HOSPADM

## 2022-07-10 RX ORDER — OXYCODONE HCL 5 MG/5 ML
5 SOLUTION, ORAL ORAL
Status: DISCONTINUED | OUTPATIENT
Start: 2022-07-10 | End: 2022-07-10 | Stop reason: HOSPADM

## 2022-07-10 RX ORDER — IBUPROFEN 200 MG
400 TABLET ORAL EVERY 6 HOURS PRN
COMMUNITY
End: 2022-08-12

## 2022-07-10 RX ORDER — HYDROMORPHONE HYDROCHLORIDE 1 MG/ML
0.25 INJECTION, SOLUTION INTRAMUSCULAR; INTRAVENOUS; SUBCUTANEOUS
Status: DISCONTINUED | OUTPATIENT
Start: 2022-07-10 | End: 2022-07-11 | Stop reason: HOSPADM

## 2022-07-10 RX ORDER — POLYETHYLENE GLYCOL 3350 17 G/17G
1 POWDER, FOR SOLUTION ORAL
Status: DISCONTINUED | OUTPATIENT
Start: 2022-07-10 | End: 2022-07-11 | Stop reason: HOSPADM

## 2022-07-10 RX ADMIN — OXYCODONE HYDROCHLORIDE 2.5 MG: 5 TABLET ORAL at 21:29

## 2022-07-10 RX ADMIN — SODIUM CHLORIDE 3 G: 900 INJECTION INTRAVENOUS at 21:33

## 2022-07-10 RX ADMIN — PROPOFOL 180 MG: 10 INJECTION, EMULSION INTRAVENOUS at 18:28

## 2022-07-10 RX ADMIN — POTASSIUM CHLORIDE AND SODIUM CHLORIDE: 900; 150 INJECTION, SOLUTION INTRAVENOUS at 20:52

## 2022-07-10 RX ADMIN — SUGAMMADEX 200 MG: 100 INJECTION, SOLUTION INTRAVENOUS at 18:37

## 2022-07-10 RX ADMIN — LABETALOL HYDROCHLORIDE 5 MG: 5 INJECTION INTRAVENOUS at 19:30

## 2022-07-10 RX ADMIN — HYDRALAZINE HYDROCHLORIDE 5 MG: 20 INJECTION INTRAMUSCULAR; INTRAVENOUS at 19:12

## 2022-07-10 RX ADMIN — ONDANSETRON 4 MG: 2 INJECTION INTRAMUSCULAR; INTRAVENOUS at 18:33

## 2022-07-10 RX ADMIN — SODIUM CHLORIDE 1000 ML: 9 INJECTION, SOLUTION INTRAVENOUS at 17:18

## 2022-07-10 RX ADMIN — SODIUM CHLORIDE 3 G: 900 INJECTION INTRAVENOUS at 16:07

## 2022-07-10 RX ADMIN — DEXAMETHASONE SODIUM PHOSPHATE 8 MG: 4 INJECTION, SOLUTION INTRA-ARTICULAR; INTRALESIONAL; INTRAMUSCULAR; INTRAVENOUS; SOFT TISSUE at 18:33

## 2022-07-10 RX ADMIN — HYDRALAZINE HYDROCHLORIDE 5 MG: 20 INJECTION INTRAMUSCULAR; INTRAVENOUS at 19:18

## 2022-07-10 RX ADMIN — HYDRALAZINE HYDROCHLORIDE 5 MG: 20 INJECTION INTRAMUSCULAR; INTRAVENOUS at 19:01

## 2022-07-10 RX ADMIN — IOHEXOL 100 ML: 350 INJECTION, SOLUTION INTRAVENOUS at 11:15

## 2022-07-10 RX ADMIN — SODIUM CHLORIDE, POTASSIUM CHLORIDE, SODIUM LACTATE AND CALCIUM CHLORIDE: 600; 310; 30; 20 INJECTION, SOLUTION INTRAVENOUS at 18:32

## 2022-07-10 RX ADMIN — LABETALOL HYDROCHLORIDE 5 MG: 5 INJECTION INTRAVENOUS at 19:36

## 2022-07-10 RX ADMIN — HYDRALAZINE HYDROCHLORIDE 5 MG: 20 INJECTION INTRAMUSCULAR; INTRAVENOUS at 19:08

## 2022-07-10 RX ADMIN — DEXAMETHASONE SODIUM PHOSPHATE 4 MG: 4 INJECTION, SOLUTION INTRA-ARTICULAR; INTRALESIONAL; INTRAMUSCULAR; INTRAVENOUS; SOFT TISSUE at 16:07

## 2022-07-10 RX ADMIN — FENTANYL CITRATE 100 MCG: 50 INJECTION, SOLUTION INTRAMUSCULAR; INTRAVENOUS at 18:25

## 2022-07-10 RX ADMIN — CHLORHEXIDINE GLUCONATE 0.12% ORAL RINSE 15 ML: 1.2 LIQUID ORAL at 20:52

## 2022-07-10 RX ADMIN — ROCURONIUM BROMIDE 50 MG: 10 INJECTION, SOLUTION INTRAVENOUS at 18:28

## 2022-07-10 RX ADMIN — LIDOCAINE HYDROCHLORIDE 60 MG: 20 INJECTION, SOLUTION EPIDURAL; INFILTRATION; INTRACAUDAL at 18:28

## 2022-07-10 ASSESSMENT — LIFESTYLE VARIABLES
TOTAL SCORE: 0
TOTAL SCORE: 0
CONSUMPTION TOTAL: POSITIVE
EVER FELT BAD OR GUILTY ABOUT YOUR DRINKING: NO
ON A TYPICAL DAY WHEN YOU DRINK ALCOHOL HOW MANY DRINKS DO YOU HAVE: 1
TOTAL SCORE: 0
HOW MANY TIMES IN THE PAST YEAR HAVE YOU HAD 5 OR MORE DRINKS IN A DAY: 3
HAVE YOU EVER FELT YOU SHOULD CUT DOWN ON YOUR DRINKING: NO
AVERAGE NUMBER OF DAYS PER WEEK YOU HAVE A DRINK CONTAINING ALCOHOL: 2
EVER HAD A DRINK FIRST THING IN THE MORNING TO STEADY YOUR NERVES TO GET RID OF A HANGOVER: NO
HAVE PEOPLE ANNOYED YOU BY CRITICIZING YOUR DRINKING: NO
ALCOHOL_USE: YES

## 2022-07-10 ASSESSMENT — COGNITIVE AND FUNCTIONAL STATUS - GENERAL
MOBILITY SCORE: 24
SUGGESTED CMS G CODE MODIFIER DAILY ACTIVITY: CH
SUGGESTED CMS G CODE MODIFIER MOBILITY: CH
DAILY ACTIVITIY SCORE: 24

## 2022-07-10 ASSESSMENT — PAIN DESCRIPTION - PAIN TYPE
TYPE: REFERRED PAIN

## 2022-07-10 ASSESSMENT — ENCOUNTER SYMPTOMS
STRIDOR: 0
VOMITING: 0
COUGH: 0
FOCAL WEAKNESS: 0
ABDOMINAL PAIN: 0
EYE DISCHARGE: 0
MYALGIAS: 0
FLANK PAIN: 0
SHORTNESS OF BREATH: 0
FEVER: 0
EYE REDNESS: 0
BRUISES/BLEEDS EASILY: 0
NERVOUS/ANXIOUS: 0
CHILLS: 0

## 2022-07-10 ASSESSMENT — PATIENT HEALTH QUESTIONNAIRE - PHQ9
1. LITTLE INTEREST OR PLEASURE IN DOING THINGS: NOT AT ALL
2. FEELING DOWN, DEPRESSED, IRRITABLE, OR HOPELESS: NOT AT ALL
SUM OF ALL RESPONSES TO PHQ9 QUESTIONS 1 AND 2: 0

## 2022-07-10 ASSESSMENT — PAIN SCALES - PAIN ASSESSMENT IN ADVANCED DEMENTIA (PAINAD): BODYLANGUAGE: RELAXED

## 2022-07-10 ASSESSMENT — PAIN SCALES - GENERAL: PAIN_LEVEL: 1

## 2022-07-10 NOTE — CONSULTS
Oral & Facial Surgery   Consultation Note    ID:  Antonio Fisher June     HPI:  Pt presetned to ED 2 days ago with CC of facial swelling and pain.  Was given PO abx and sweeling and pain increased.  He presentes today for eval and tx.  OMFS consulted.      Chief Complaint:  Facial swelling / pain    Exam:   Gen:  AAO x 3, NAD  Head:  NCAT  Eyes:  PERRLA, EOMI  Ears:  EAC Clear  Nose:  Nares patent, no d/c, no heme.  Right lateral nose with STS and no NLF visualized.    Mouth:  [place] 46 Mm, tooth # 4 with large filling present.  Tenderness to palpation on #4, with some pain on #3, but very slight.  Swelling in area of right premaxilla / laterla nasal space. Tissue locally erythematous, induration present  Throat:  OP Clear    Imaging:   Maxillofacial CT:  Abscess located at right pre-maxilla associated with tooth #4, tooht #3 with small PARL on palatal root.  Pano:  PARL on #4, some on #3    Assessment:  Facial abscess 2/2 odontogenic infection    Plan:    To OR for I&D with exts    Thank You,  Clemente Lombardo, DDS  [phone]

## 2022-07-10 NOTE — ASSESSMENT & PLAN NOTE
Started on Unasyn in the emergency room, continue for now follow cultures and sensitivities.  Oral surgery, Dr. Lombardo consulted, plan for surgical extraction

## 2022-07-10 NOTE — ASSESSMENT & PLAN NOTE
Started on Unasyn in the emergency room, continue for now follow cultures and sensitivities.  Oral surgery, Dr. Lombardo consulted, plan for surgical intervention

## 2022-07-10 NOTE — ED NOTES
Med rec updated and complete, per pts wife   Allergies reviewed, per pts wife  Interviewed pt with wife at bedside with permission from pt.

## 2022-07-10 NOTE — ED PROVIDER NOTES
"ED Provider Note    CHIEF COMPLAINT  Chief Complaint   Patient presents with   • Facial Swelling     RT facial / lip swelling since yesterday am  Getting worse   • Dental Pain     RT upper dental pain Friday night  Seen yesterday in ER here at ER Started on oral ABX  Now pt states a \" pus pocket along upper gum line \"  Denies diff breathing       HPI  Antonio Fisher June is a 46 y.o. male who presents for evaluation of worsening right sided upper facial swelling and now pain tracking down the right angle of the mandible.  The patient is an otherwise healthy 46-year-old.  He was seen here yesterday with some very mild swelling, COVID test was performed which is still pending and the thought was that he had an early dental infection.  The patient was started on oral antibiotics but reports symptoms have significantly worsened.  He denies any trismus no muffled voice or hoarseness.  No high fevers or chills.    REVIEW OF SYSTEMS  See HPI for further details.  No night sweats weight loss numbness tingling weakness rash all other systems are negative.     PAST MEDICAL HISTORY  Past Medical History:   Diagnosis Date   • GERD (gastroesophageal reflux disease)        FAMILY HISTORY  Noncontributory    SOCIAL HISTORY  Social History     Socioeconomic History   • Marital status:    Tobacco Use   • Smoking status: Never Smoker   • Smokeless tobacco: Never Used   Substance and Sexual Activity   • Alcohol use: Yes     Comment: Occasionally   • Drug use: No   • Sexual activity: Yes     Partners: Female     Birth control/protection: Surgical       SURGICAL HISTORY  No past surgical history on file.  No major surgeries  CURRENT MEDICATIONS    Current Facility-Administered Medications:   •  ampicillin/sulbactam (UNASYN) 3 g in  mL IVPB, 3 g, Intravenous, Once, Ashish Wagner M.D., Last Rate: 200 mL/hr at 07/10/22 1607, 3 g at 07/10/22 1607  •  NS infusion 1,000 mL, 1,000 mL, Intravenous, Once, Ashish Wagner, " "M.D.  •  esomeprazole (NEXIUM) capsule 20 mg, 20 mg, Oral, DAILY, Catrina Bell M.D.  •  ampicillin/sulbactam (UNASYN) 3 g in  mL IVPB, 3 g, Intravenous, Q6HR, Catrina Bell M.D.    Current Outpatient Medications:   •  amoxicillin-clavulanate (AUGMENTIN) 875-125 MG Tab, Take 1 Tablet by mouth 2 times a day for 10 days., Disp: 20 Tablet, Rfl: 0  •  oxyCODONE-acetaminophen (PERCOCET) 5-325 MG Tab, Take 1-2 Tablets by mouth every 6 hours as needed for Moderate Pain (pain) for up to 3 days., Disp: 10 Tablet, Rfl: 0  •  ondansetron (ZOFRAN ODT) 4 MG TABLET DISPERSIBLE, Take 1 Tab by mouth every 8 hours as needed., Disp: 20 Tab, Rfl: 0  •  fexofenadine (ALLEGRA) 180 MG tablet, Take 1 Tab by mouth every day., Disp: 30 Tab, Rfl: 3  •  esomeprazole (NEXIUM) 20 MG capsule, Take 1 Cap by mouth every day. 1/2 HOUR PRIOR TO SAME MEAL DAILY, Disp: 30 Cap, Rfl: 5      ALLERGIES  Allergies   Allergen Reactions   • Nkda [No Known Drug Allergy]        PHYSICAL EXAM  VITAL SIGNS: BP (!) 145/99   Pulse 100   Temp 37.4 °C (99.3 °F) (Temporal)   Resp 16   Ht 1.727 m (5' 8\")   Wt 106 kg (234 lb 5.6 oz)   SpO2 95%   BMI 35.63 kg/m²       Constitutional: Well developed, Well nourished, No acute distress, Non-toxic appearance.   HENT: Normocephalic, Atraumatic, Bilateral external ears normal, Oropharynx moist, No oral exudates, Nose normal.  There is notable swelling in the soft tissues overlying the maxillary prominence above the right maxillary incisor.  Uvula is midline.  There is swelling tracking down to the angle of the mandible on the right side as well.  Patient has notable facial asymmetry.  Patient is handling his secretions no stridor  Eyes: PERRLA, EOMI, Conjunctiva normal, No discharge.   Neck: Normal range of motion, No tenderness, Supple, No stridor.   Lymphatic: No lymphadenopathy noted.   Cardiovascular: Normal heart rate, Normal rhythm, No murmurs, No rubs, No gallops.   Thorax & Lungs: Normal breath " sounds, No respiratory distress, No wheezing, No chest tenderness.   Abdomen: Bowel sounds normal, Soft, No tenderness, No masses, No pulsatile masses.   Skin: Warm, Dry, No erythema, No rash.   Back: No tenderness, No CVA tenderness.   Extremities: Intact distal pulses, No edema, No tenderness, No cyanosis, No clubbing.   Neurologic: Alert & oriented x 3, Normal motor function, Normal sensory function, No focal deficits noted.   Psychiatric: Anxious    CT-MAXILLOFACIAL WITH PLUS RECONS   Final Result      1.  Right premandibular and premaxillary cellulitis. No soft tissue abscess.      2.  Periodontal disease with dental caries.      [id number]    (Results Pending)     Results for orders placed or performed during the hospital encounter of 07/10/22   CBC WITH DIFFERENTIAL   Result Value Ref Range    WBC 12.5 (H) 4.8 - 10.8 K/uL    RBC 5.25 4.70 - 6.10 M/uL    Hemoglobin 15.0 14.0 - 18.0 g/dL    Hematocrit 44.6 42.0 - 52.0 %    MCV 85.0 81.4 - 97.8 fL    MCH 28.6 27.0 - 33.0 pg    MCHC 33.6 (L) 33.7 - 35.3 g/dL    RDW 41.4 35.9 - 50.0 fL    Platelet Count 201 164 - 446 K/uL    MPV 10.4 9.0 - 12.9 fL    Neutrophils-Polys 90.40 (H) 44.00 - 72.00 %    Lymphocytes 4.90 (L) 22.00 - 41.00 %    Monocytes 3.80 0.00 - 13.40 %    Eosinophils 0.20 0.00 - 6.90 %    Basophils 0.20 0.00 - 1.80 %    Immature Granulocytes 0.50 0.00 - 0.90 %    Nucleated RBC 0.00 /100 WBC    Neutrophils (Absolute) 11.30 (H) 1.82 - 7.42 K/uL    Lymphs (Absolute) 0.61 (L) 1.00 - 4.80 K/uL    Monos (Absolute) 0.48 0.00 - 0.85 K/uL    Eos (Absolute) 0.02 0.00 - 0.51 K/uL    Baso (Absolute) 0.03 0.00 - 0.12 K/uL    Immature Granulocytes (abs) 0.06 0.00 - 0.11 K/uL    NRBC (Absolute) 0.00 K/uL   Comp Metabolic Panel   Result Value Ref Range    Sodium 133 (L) 135 - 145 mmol/L    Potassium 3.3 (L) 3.6 - 5.5 mmol/L    Chloride 100 96 - 112 mmol/L    Co2 20 20 - 33 mmol/L    Anion Gap 13.0 7.0 - 16.0    Glucose 149 (H) 65 - 99 mg/dL    Bun 7 (L) 8  - 22 mg/dL    Creatinine 0.80 0.50 - 1.40 mg/dL    Calcium 8.9 8.4 - 10.2 mg/dL    AST(SGOT) 39 12 - 45 U/L    ALT(SGPT) 53 (H) 2 - 50 U/L    Alkaline Phosphatase 94 30 - 99 U/L    Total Bilirubin 0.8 0.1 - 1.5 mg/dL    Albumin 4.2 3.2 - 4.9 g/dL    Total Protein 7.1 6.0 - 8.2 g/dL    Globulin 2.9 1.9 - 3.5 g/dL    A-G Ratio 1.4 g/dL   ESTIMATED GFR   Result Value Ref Range    GFR (CKD-EPI) 109 >60 mL/min/1.73 m 2         COURSE & MEDICAL DECISION MAKING  Pertinent Labs & Imaging studies reviewed. (See chart for details)  An IV was established.  Reviewed the patient's visit history from yesterday.  Here he has worsening facial cellulitis and possible early dental abscess.  CT scan does not demonstrate a large drainable abscess but this was reviewed with Dr. Valles with oral surgery.  He agrees that the patient will require definitive dental extraction possibly 2-3 teeth.  He would prefer to do this in the operating room.  The patient was given IV fluids a dose of Decadron and Unasyn.  He will be admitted to internal medicine with oral surgery consult and a planned trip to the operating room later this evening    FINAL IMPRESSION  1.  Acute right maxillary dental abscess with cellulitis         Electronically signed by: Ashish Wagner M.D., 7/10/2022 2:51 PM

## 2022-07-10 NOTE — ED TRIAGE NOTES
Pt has marked RT facial swelling RT cheek and Rt jaw Mild Rt lip swelling Afebrile Pain controlled on prescribed meds

## 2022-07-10 NOTE — ASSESSMENT & PLAN NOTE
Likely secondary to reduced oral intake and increase in insensory losses.  Encourage oral intake as tolerated, antiemetics as needed.  Intravenous hydration until adequate oral intake is achieved

## 2022-07-10 NOTE — ASSESSMENT & PLAN NOTE
This is Sepsis Present on admission  SIRS criteria identified on my evaluation include: Tachycardia, with heart rate greater than 90 BPM and Leukocytosis, with WBC greater than 12,000  Source is dental infection/perimandibular cellulitis  Sepsis protocol initiated  Fluid resuscitation ordered per protocol  Crystalloid Fluid Administration: Fluid resuscitation ordered per standard protocol - 30 mL/kg per current or ideal body weight  IV antibiotics as appropriate for source of sepsis  Reassessment: I have reassessed the patient's hemodynamic status

## 2022-07-10 NOTE — H&P
"Hospital Medicine History & Physical Note    Date of Service  7/10/2022    Primary Care Physician  Bhupendra Hurtado P.A.-C.    Consultants  Oral surgery, Dr Lombardo     Code Status  Full Code    Chief Complaint  Chief Complaint   Patient presents with   • Facial Swelling     RT facial / lip swelling since yesterday am  Getting worse   • Dental Pain     RT upper dental pain Friday night  Seen yesterday in ER here at [facility] Started on oral ABX  Now pt states a \" pus pocket along upper gum line \"  Denies diff breathing     History of Presenting Illness  Antonio Fisher June is a 46 y.o. male with no significant past medical history other than overweight who presented 7/10/2022 with progressive right-sided facial pain and swelling.  Patient reports that symptoms started 2 days prior to admission.  Initially as swelling the following day started to have pain.  The pain was initially mild but became severe rated as 8-9/10.  The pain is worse with opening the mouth/chewing.  No radiation to other places.  No relieving factors.  Patient did not notice any associated choking nausea or vomiting.    I discussed the plan of care with emergency department physician, the patient and patient's wife was present at bedside in the emergency room    Review of Systems  Review of Systems   Constitutional: Negative for chills and fever.   HENT:        Right-sided facial swelling and pain   Eyes: Negative for discharge and redness.   Respiratory: Negative for cough, shortness of breath and stridor.    Cardiovascular: Negative for chest pain and leg swelling.   Gastrointestinal: Negative for abdominal pain and vomiting.   Genitourinary: Negative for flank pain.   Musculoskeletal: Negative for myalgias.   Skin: Negative.    Neurological: Negative for focal weakness.   Endo/Heme/Allergies: Does not bruise/bleed easily.   Psychiatric/Behavioral: The patient is not nervous/anxious.      Past Medical History   has a past medical history of GERD " (gastroesophageal reflux disease).    Surgical History  No prior surgeries other than hand suturing after a cut    Family History  No family history of heart disease in father or mother    Social History   reports that he has never smoked. He has never used smokeless tobacco. He reports current alcohol use. He reports that he does not use drugs.    Allergies  Allergies   Allergen Reactions   • Nkda [No Known Drug Allergy]      Medications  Prior to Admission Medications   Prescriptions Last Dose Informant Patient Reported? Taking?   amoxicillin-clavulanate (AUGMENTIN) 875-125 MG Tab   No No   Sig: Take 1 Tablet by mouth 2 times a day for 10 days.   esomeprazole (NEXIUM) 20 MG capsule   No No   Sig: Take 1 Cap by mouth every day. 1/2 HOUR PRIOR TO SAME MEAL DAILY   fexofenadine (ALLEGRA) 180 MG tablet   No No   Sig: Take 1 Tab by mouth every day.   ondansetron (ZOFRAN ODT) 4 MG TABLET DISPERSIBLE   No No   Sig: Take 1 Tab by mouth every 8 hours as needed.   oxyCODONE-acetaminophen (PERCOCET) 5-325 MG Tab   No No   Sig: Take 1-2 Tablets by mouth every 6 hours as needed for Moderate Pain (pain) for up to 3 days.      Facility-Administered Medications: None     Physical Exam  Temp:  [37.3 °C (99.1 °F)-37.4 °C (99.3 °F)] 37.4 °C (99.3 °F)  Pulse:  [100] 100  Resp:  [16] 16  BP: (145-152)/() 145/99  SpO2:  [95 %] 95 %  Blood Pressure: (!) 145/99   Temperature: 37.4 °C (99.3 °F)   Pulse: 100   Respiration: 16   Pulse Oximetry: 95 %     Physical Exam  Constitutional:       General: He is not in acute distress.     Appearance: He is not ill-appearing or diaphoretic.   HENT:      Head: Atraumatic.      Right Ear: External ear normal.      Left Ear: External ear normal.      Nose: No congestion or rhinorrhea.      Mouth/Throat:      Mouth: Mucous membranes are dry.      Comments: Edema, gum swelling and tenderness  Eyes:      General: No scleral icterus.        Right eye: No discharge.         Left eye: No discharge.       Pupils: Pupils are equal, round, and reactive to light.   Cardiovascular:      Rate and Rhythm: Regular rhythm. Tachycardia present.   Pulmonary:      Effort: Pulmonary effort is normal.   Abdominal:      General: There is no distension.   Musculoskeletal:      Cervical back: Neck supple. No rigidity. No muscular tenderness.      Right lower leg: No edema.      Left lower leg: No edema.   Skin:     General: Skin is dry.      Capillary Refill: Capillary refill takes 2 to 3 seconds.      Coloration: Skin is not jaundiced or pale.   Neurological:      Mental Status: He is alert and oriented to person, place, and time.      Coordination: Coordination normal.   Psychiatric:         Mood and Affect: Mood normal.         Behavior: Behavior normal.       Laboratory:  Recent Labs     07/10/22  1506   WBC 12.5*   RBC 5.25   HEMOGLOBIN 15.0   HEMATOCRIT 44.6   MCV 85.0   MCH 28.6   MCHC 33.6*   RDW 41.4   PLATELETCT 201   MPV 10.4     Recent Labs     07/10/22  1506   SODIUM 133*   POTASSIUM 3.3*   CHLORIDE 100   CO2 20   GLUCOSE 149*   BUN 7*   CREATININE 0.80   CALCIUM 8.9     Recent Labs     07/10/22  1506   ALTSGPT 53*   ASTSGOT 39   ALKPHOSPHAT 94   TBILIRUBIN 0.8   GLUCOSE 149*         No results for input(s): NTPROBNP in the last 72 hours.      No results for input(s): TROPONINT in the last 72 hours.    Imaging:  [id number]   Final Result      1.  No mandible fracture.   2.  No tooth abscess demonstrated.   3.  Bilateral impacted mandibular molars.      CT-MAXILLOFACIAL WITH PLUS RECONS   Final Result      1.  Right premandibular and premaxillary cellulitis. No soft tissue abscess.      2.  Periodontal disease with dental caries.        Assessment/Plan:  Justification for Admission Status  I anticipate this patient will require at least two midnights for appropriate medical management, necessitating inpatient admission because Patient presenting with sepsis, dental infection and perimandibular cellulitis.  He  will require intravenous antibiotics and evaluation by oral surgery and likely surgical intervention.    * Sepsis (HCC)- (present on admission)  Assessment & Plan  This is Sepsis Present on admission  SIRS criteria identified on my evaluation include: Tachycardia, with heart rate greater than 90 BPM and Leukocytosis, with WBC greater than 12,000  Source is dental infection/perimandibular cellulitis  Sepsis protocol initiated  Fluid resuscitation ordered per protocol  Crystalloid Fluid Administration: Fluid resuscitation ordered per standard protocol - 30 mL/kg per current or ideal body weight  IV antibiotics as appropriate for source of sepsis  Reassessment: I have reassessed the patient's hemodynamic status     Cellulitis of the right premandibular and premaxillary cellulitis- (present on admission)  Assessment & Plan  Started on Unasyn in the emergency room, continue for now follow cultures and sensitivities.  Oral surgery, Dr. Lombardo consulted, plan for surgical intervention    Dental infection- (present on admission)  Assessment & Plan  Started on Unasyn in the emergency room, continue for now follow cultures and sensitivities.  Oral surgery, Dr. Lombardo consulted, plan for surgical extraction     Hyperglycemia- (present on admission)  Assessment & Plan  No known history of diabetes, will check glycated hemoglobin  Continue to monitor glucose  Consider starting sliding scale insulin according to trend    Dehydration- (present on admission)  Assessment & Plan  Likely secondary to reduced oral intake and increase in insensory losses.  Encourage oral intake as tolerated, antiemetics as needed.  Intravenous hydration until adequate oral intake is achieved    Hyponatremia- (present on admission)  Assessment & Plan  Mild, hypovolemic hyponatremia  I expect Na to improve with IVF    Hypokalemia- (present on admission)  Assessment & Plan  Replacing, checking magnesium   Continue to monitor replace as needed     VTE  prophylaxis: SCDs/TEDs and pharmacologic prophylaxis contraindicated due to Plan for surgical intervention

## 2022-07-10 NOTE — ASSESSMENT & PLAN NOTE
No known history of diabetes, will check glycated hemoglobin  Continue to monitor glucose  Consider starting sliding scale insulin according to trend

## 2022-07-11 VITALS
HEART RATE: 92 BPM | HEIGHT: 68 IN | SYSTOLIC BLOOD PRESSURE: 134 MMHG | OXYGEN SATURATION: 96 % | RESPIRATION RATE: 18 BRPM | BODY MASS INDEX: 35.52 KG/M2 | DIASTOLIC BLOOD PRESSURE: 76 MMHG | WEIGHT: 234.35 LBS | TEMPERATURE: 98.6 F

## 2022-07-11 PROBLEM — E86.0 DEHYDRATION: Status: RESOLVED | Noted: 2022-07-10 | Resolved: 2022-07-11

## 2022-07-11 PROBLEM — E87.1 HYPONATREMIA: Status: RESOLVED | Noted: 2022-07-10 | Resolved: 2022-07-11

## 2022-07-11 PROBLEM — K04.7 DENTAL ABSCESS: Status: RESOLVED | Noted: 2022-07-10 | Resolved: 2022-07-11

## 2022-07-11 PROBLEM — E87.6 HYPOKALEMIA: Status: RESOLVED | Noted: 2022-07-10 | Resolved: 2022-07-11

## 2022-07-11 PROBLEM — A41.9 SEPSIS (HCC): Status: RESOLVED | Noted: 2022-07-10 | Resolved: 2022-07-11

## 2022-07-11 PROBLEM — R73.9 HYPERGLYCEMIA: Status: RESOLVED | Noted: 2022-07-10 | Resolved: 2022-07-11

## 2022-07-11 LAB
ALBUMIN SERPL BCP-MCNC: 4.1 G/DL (ref 3.2–4.9)
ALBUMIN/GLOB SERPL: 1.4 G/DL
ALP SERPL-CCNC: 91 U/L (ref 30–99)
ALT SERPL-CCNC: 65 U/L (ref 2–50)
ANION GAP SERPL CALC-SCNC: 13 MMOL/L (ref 7–16)
AST SERPL-CCNC: 35 U/L (ref 12–45)
BILIRUB SERPL-MCNC: 0.5 MG/DL (ref 0.1–1.5)
BUN SERPL-MCNC: 6 MG/DL (ref 8–22)
CALCIUM SERPL-MCNC: 8.8 MG/DL (ref 8.4–10.2)
CHLORIDE SERPL-SCNC: 100 MMOL/L (ref 96–112)
CO2 SERPL-SCNC: 20 MMOL/L (ref 20–33)
CREAT SERPL-MCNC: 0.79 MG/DL (ref 0.5–1.4)
ERYTHROCYTE [DISTWIDTH] IN BLOOD BY AUTOMATED COUNT: 43 FL (ref 35.9–50)
EST. AVERAGE GLUCOSE BLD GHB EST-MCNC: 117 MG/DL
GFR SERPLBLD CREATININE-BSD FMLA CKD-EPI: 110 ML/MIN/1.73 M 2
GLOBULIN SER CALC-MCNC: 2.9 G/DL (ref 1.9–3.5)
GLUCOSE SERPL-MCNC: 164 MG/DL (ref 65–99)
HBA1C MFR BLD: 5.7 % (ref 4–5.6)
HCT VFR BLD AUTO: 43.7 % (ref 42–52)
HGB BLD-MCNC: 14.5 G/DL (ref 14–18)
LACTATE BLD-SCNC: 1.3 MMOL/L (ref 0.5–2)
LACTATE BLD-SCNC: 1.5 MMOL/L (ref 0.5–2)
MAGNESIUM SERPL-MCNC: 1.9 MG/DL (ref 1.5–2.5)
MCH RBC QN AUTO: 28.5 PG (ref 27–33)
MCHC RBC AUTO-ENTMCNC: 33.2 G/DL (ref 33.7–35.3)
MCV RBC AUTO: 86 FL (ref 81.4–97.8)
PLATELET # BLD AUTO: 222 K/UL (ref 164–446)
PMV BLD AUTO: 10.7 FL (ref 9–12.9)
POTASSIUM SERPL-SCNC: 4 MMOL/L (ref 3.6–5.5)
PROT SERPL-MCNC: 7 G/DL (ref 6–8.2)
RBC # BLD AUTO: 5.08 M/UL (ref 4.7–6.1)
SODIUM SERPL-SCNC: 133 MMOL/L (ref 135–145)
WBC # BLD AUTO: 11 K/UL (ref 4.8–10.8)

## 2022-07-11 PROCEDURE — 99238 HOSP IP/OBS DSCHRG MGMT 30/<: CPT | Performed by: INTERNAL MEDICINE

## 2022-07-11 PROCEDURE — 36415 COLL VENOUS BLD VENIPUNCTURE: CPT

## 2022-07-11 PROCEDURE — 83605 ASSAY OF LACTIC ACID: CPT | Mod: 91

## 2022-07-11 PROCEDURE — 700102 HCHG RX REV CODE 250 W/ 637 OVERRIDE(OP): Performed by: HOSPITALIST

## 2022-07-11 PROCEDURE — A9270 NON-COVERED ITEM OR SERVICE: HCPCS | Performed by: DENTIST

## 2022-07-11 PROCEDURE — 83735 ASSAY OF MAGNESIUM: CPT

## 2022-07-11 PROCEDURE — A9270 NON-COVERED ITEM OR SERVICE: HCPCS | Performed by: HOSPITALIST

## 2022-07-11 PROCEDURE — 80053 COMPREHEN METABOLIC PANEL: CPT

## 2022-07-11 PROCEDURE — 700111 HCHG RX REV CODE 636 W/ 250 OVERRIDE (IP): Performed by: HOSPITALIST

## 2022-07-11 PROCEDURE — 700105 HCHG RX REV CODE 258: Performed by: HOSPITALIST

## 2022-07-11 PROCEDURE — 700102 HCHG RX REV CODE 250 W/ 637 OVERRIDE(OP): Performed by: DENTIST

## 2022-07-11 PROCEDURE — 85027 COMPLETE CBC AUTOMATED: CPT

## 2022-07-11 RX ORDER — OMEPRAZOLE 20 MG/1
20 CAPSULE, DELAYED RELEASE ORAL DAILY
Qty: 30 CAPSULE | Refills: 0 | Status: CANCELLED | OUTPATIENT
Start: 2022-07-12 | End: 2022-08-11

## 2022-07-11 RX ORDER — CHLORHEXIDINE GLUCONATE ORAL RINSE 1.2 MG/ML
15 SOLUTION DENTAL 2 TIMES DAILY
Qty: 473 ML | Refills: 0 | Status: SHIPPED | OUTPATIENT
Start: 2022-07-11 | End: 2022-07-27

## 2022-07-11 RX ADMIN — SENNOSIDES AND DOCUSATE SODIUM 2 TABLET: 50; 8.6 TABLET ORAL at 05:43

## 2022-07-11 RX ADMIN — CHLORHEXIDINE GLUCONATE 0.12% ORAL RINSE 15 ML: 1.2 LIQUID ORAL at 05:43

## 2022-07-11 RX ADMIN — OXYCODONE HYDROCHLORIDE 5 MG: 5 TABLET ORAL at 03:26

## 2022-07-11 RX ADMIN — SODIUM CHLORIDE 3 G: 900 INJECTION INTRAVENOUS at 03:22

## 2022-07-11 RX ADMIN — OMEPRAZOLE 20 MG: 20 CAPSULE, DELAYED RELEASE ORAL at 05:43

## 2022-07-11 ASSESSMENT — PAIN SCALES - PAIN ASSESSMENT IN ADVANCED DEMENTIA (PAINAD): BODYLANGUAGE: RELAXED

## 2022-07-11 NOTE — HOSPITAL COURSE
46-year-old male with past medical history of GERD admitted on 7/10/2022 for worsening right upper facial pain with swelling, worsening in the last 2 days, severe in nature, difficulty opening mouth which worsens pain.  In the ED patient was found to have leukocytosis 12.5, tachycardia HR>90bpm consistently, mild elevation in temperature 99.7F, hypokalemia 3.3, CT scan showed right Jeevan mandibular and premaxillary cellulitis, no overt abscess noted, periodontal disease with dental caries.  OMFS was consulted, patient was taken to the OR.  OR report indicates patient had severe dental decay on tooth #4, with maxillary abscess.  Patient required intraoral I&D and extraction of tooth #4.  Patient presented with sepsis upon admission due to maxillary dental abscess.  Sepsis resolved once patient's abscess was drained in OR.

## 2022-07-11 NOTE — OR NURSING
1842 Patient to PACU from OR after report received  Face mask in place  Patient waking he has a right side of face/cheek.     1900 patient has high blood pressure  5 mg of hydralazine. 175/100    1907 repeated hydralazine 5 mg 184/103    1912 repeated hydralazine 5 mg  174/102    1918  Repeated 5 mg hydralazine for /94    1925 patient is awake alert and oriented. He denies pain or nausea. Packing in mouth still intact. Continue to monitor BP    1929 gave 5 mg labetalol 178/98     1935 repeated 5 mg labetalol 171/99      1944  172/102    1948 164/98  Holding to run past Dr Aden.   Dr aden is  Okay to hold any further intervention     2000 patient meets critreia to move to [facility]  Called report to Patricia MERLOS Then called wife to update and give progress.     2005 taken to [facility] in stable condition.

## 2022-07-11 NOTE — ANESTHESIA PROCEDURE NOTES
Airway    Date/Time: 7/10/2022 6:29 PM  Performed by: Markus Obrien M.D.  Authorized by: Markus Obrien M.D.     Location:  OR  Urgency:  Elective  Difficult Airway: No    Indications for Airway Management:  Anesthesia      Spontaneous Ventilation: absent    Sedation Level:  Deep  Preoxygenated: Yes    Patient Position:  Sniffing  Final Airway Type:  Endotracheal airway  Final Endotracheal Airway:  ETT  Cuffed: Yes    Technique Used for Successful ETT Placement:  Direct laryngoscopy    Insertion Site:  Oral  Blade Type:  Mendez  Laryngoscope Blade/Videolaryngoscope Blade Size:  3  ETT Size (mm):  8.0  Measured from:  Lips  ETT to Lips (cm):  24  Placement Verified by: capnometry    Cormack-Lehane Classification:  Grade IIb - view of arytenoids or posterior of glottis only  Number of Attempts at Approach:  1  Number of Other Approaches Attempted:  0

## 2022-07-11 NOTE — ANESTHESIA PREPROCEDURE EVALUATION
Case: [id number] Date/Time: 07/10/22 1745    Procedures:       EXTRACTION, TOOTH (Mouth)      INCISION AND DRAINAGE, ABSCESS, SKIN (Mouth)    Anesthesia type: General    Location:  OR  / SURGERY [facility]    Surgeons: Clemente Lombardo D.D.S.          Relevant Problems   No relevant active problems       Physical Exam    Airway   Mallampati: II  TM distance: >3 FB  Neck ROM: full       Cardiovascular - normal exam  Rhythm: regular  Rate: normal  (-) murmur     Dental - normal exam           Pulmonary - normal exam  Breath sounds clear to auscultation     Abdominal    Neurological - normal exam                 Anesthesia Plan    ASA 2- EMERGENT   ASA physical status emergent criteria: acutely contaminated wound or identified infection source    Plan - general       Airway plan will be ETT          Induction: intravenous    Postoperative Plan: Postoperative administration of opioids is intended.    Pertinent diagnostic labs and testing reviewed    Informed Consent:    Anesthetic plan and risks discussed with patient.    Use of blood products discussed with: patient whom consented to blood products.

## 2022-07-11 NOTE — OP REPORT
Operative Report  Oral & Facial Surgery  Dental Extractions    Pt Name:  Antonio Fisher June     Date of Surgery: 7/10/2022     Surgeon:  Clemente Lombardo DDS    Assistant: None    Pre-Op Diagnosis:     Dental Decay on Teeth #s 4              Maxillary Abscess    Post Op Diagnosis:       Same    Operation Performed:           Surgical Extraction of teeth # 4   Complicated Intraoral I&D    Description of Surgery    The pt was brought to the operating room by the anesthesia team.  A time out was performed and consents were verified.  The pt was then uneventfully induced into general anesthesia.  A endotracheal tube was inserted and secured by the anesthesia team.  A more thourough exam was performed and there was cl I mobility on tooht #4.  An oropharyngeal throat pack was placed.  approximately 10 mL of 1% lidocaine with 1:100K epi was administered to the proposed surgical sites.     A 15 blade was used to make an incision on the maxillary right vestibule in the area of #4.  Blunt dissection was then carried out to the bone and purulence was encountered.  Copious irrigation was placed in the wound until clear return was encountered.      A 15 blade was used to make a sulcular incision along the sites to be extracted.  A full thickness mucoperiosteal flap was elevated.  Tooth #s 4 was removed with appropriate bone removal and sectioning with forceps and elevators.  Rongeurs and bone files were used to smooth the alveolus of the bone.  Copious irrigation was placed in the surgical sites.      The throat pack was then removed and hemostasis achieved.  The pt was then turned back over to the anesthesia team, was awakened and extubated uneventfully and taken to the PACU in stable condition.      Estimated Blood Loss:  25  Needle and sponge count:  Correct  Specimens Removed:  Teeth #s 4    Clemente Lombardo DDS

## 2022-07-11 NOTE — ANESTHESIA POSTPROCEDURE EVALUATION
Patient: Antonio Fisher June    Procedure Summary     Date: 07/10/22 Room / Location:  OR 02 / SURGERY [facility]    Anesthesia Start: 1823 Anesthesia Stop: 1846    Procedures:       EXTRACTION, TOOTH (Mouth)      INCISION AND DRAINAGE, ABSCESS, SKIN (Mouth) Diagnosis:     Surgeons: Clemente Lombardo D.D.S. Responsible Provider: Markus Obrien M.D.    Anesthesia Type: general ASA Status: 2 - Emergent          Final Anesthesia Type: general  Last vitals  BP   Blood Pressure: (!) 148/108    Temp   37.1 °C (98.8 °F)    Pulse   96   Resp   16    SpO2   94 %      Anesthesia Post Evaluation    Patient location during evaluation: PACU  Patient participation: complete - patient participated  Level of consciousness: awake and alert  Pain score: 1    Airway patency: patent  Anesthetic complications: no  Cardiovascular status: hemodynamically stable  Respiratory status: acceptable  Hydration status: euvolemic    PONV: none          No complications documented.     Nurse Pain Score: 0 (NPRS)

## 2022-07-11 NOTE — PROGRESS NOTES
4 Eyes Skin Assessment Completed by CHELITA Gomez and Clemente RN.    Head WDL  Ears WDL  Nose WDL  Mouth WDL  Neck WDL  Breast/Chest WDL  Shoulder Blades WDL  Spine WDL  (R) Arm/Elbow/Hand WDL  (L) Arm/Elbow/Hand WDL  Abdomen WDL  Groin WDL  Scrotum/Coccyx/Buttocks WDL  (R) Leg WDL  (L) Leg WDL  (R) Heel/Foot/Toe WDL  (L) Heel/Foot/Toe WDL          Devices In Places Pulse Ox      Interventions In Place N/A    Possible Skin Injury No    Pictures Uploaded Into Epic N/A  Wound Consult Placed N/A  RN Wound Prevention Protocol Ordered No

## 2022-07-11 NOTE — DISCHARGE INSTRUCTIONS
Discharge Instructions    Discharged to home by car with relative. Discharged via wheelchair, hospital escort: Yes.  Special equipment needed: Not Applicable    Be sure to schedule a follow-up appointment with your primary care doctor or any specialists as instructed.     Discharge Plan:   Diet Plan: Discussed  Activity Level: Discussed  Confirmed Follow up Appointment: Patient to Call and Schedule Appointment  Confirmed Symptoms Management: Discussed  Medication Reconciliation Updated: Yes    I understand that a diet low in cholesterol, fat, and sodium is recommended for good health. Unless I have been given specific instructions below for another diet, I accept this instruction as my diet prescription.   Other diet:     Special Instructions: None    -Is this patient being discharged with medication to prevent blood clots?  No    Is patient discharged on Warfarin / Coumadin?   No

## 2022-07-11 NOTE — DISCHARGE SUMMARY
"Discharge Summary    CHIEF COMPLAINT ON ADMISSION  Chief Complaint   Patient presents with   • Facial Swelling     RT facial / lip swelling since yesterday am  Getting worse   • Dental Pain     RT upper dental pain Friday night  Seen yesterday in ER here at ER Started on oral ABX  Now pt states a \" pus pocket along upper gum line \"  Denies diff breathing       Reason for Admission  Facial Swelling     Admission Date  7/10/2022    CODE STATUS  Full Code    HPI & HOSPITAL COURSE  This is a 46-year-old male with past medical history of GERD admitted on 7/10/2022 for worsening right upper facial pain with swelling, worsening in the last 2 days, severe in nature, difficulty opening mouth which worsens pain.  In the ED patient was found to have leukocytosis 12.5, tachycardia HR>90bpm consistently, mild elevation in temperature 99.7F, hypokalemia 3.3, CT scan showed right Jeevan mandibular and premaxillary cellulitis, no overt abscess noted, periodontal disease with dental caries.  OMFS was consulted, patient was taken to the OR.  OR report indicates patient had severe dental decay on tooth #4, with maxillary abscess.  Patient required intraoral I&D and extraction of tooth #4.  Patient presented with sepsis upon admission due to maxillary dental abscess.  Sepsis resolved once patient's abscess was drained in OR.    Patient was able to tolerate breakfast without any significant pain. No noted bleeding or swelling on discharge. Patient prescribed Peridex, sent to [facility] pharmacy. Patient stated he has Augmentin and instructed to finish course, no refill required.  Patient's pain well controlled, he did not feel he required any opioid medication at this time. He stated he uses ibuprofen at home, I instructed for him to start his Nexium medication at home in case he uses ibuprofen for pain control.      Therefore, he is discharged in good and stable condition to home with close outpatient follow-up.    The patient recovered " much more quickly than anticipated on admission.    Discharge Date  07/11/2022    FOLLOW UP ITEMS POST DISCHARGE  Dr. Lombardo [facility]    DISCHARGE DIAGNOSES  Principal Problem (Resolved):    Sepsis (HCC) POA: Yes  Active Problems:    Cellulitis of the right premandibular and premaxillary cellulitis POA: Yes  Resolved Problems:    Hypokalemia POA: Yes    Hyponatremia POA: Yes    Maxillary dental abscess POA: Yes    Dehydration POA: Yes    Hyperglycemia POA: Yes      FOLLOW UP  No future appointments.  Clemente Lombardo D.D.S.  [address]  [place] [address] [phone]    Call in 1 week(s)  Please follow up in 1 week    Bhupendra Hurtado P.A.-C.  [address]  [address]  [place] [address] [phone]    Call in 1 week(s)  Please follow up in 1-2 weeks for post hospital visit, If symptoms worsen, As needed. Continued on Augmentin.      MEDICATIONS ON DISCHARGE     Medication List      ASK your doctor about these medications      Instructions   acetaminophen 650 MG CR tablet  Commonly known as: TYLENOL   Take 650 mg by mouth every 6 hours as needed for Moderate Pain.  Dose: 650 mg     amoxicillin-clavulanate 875-125 MG Tabs  Commonly known as: AUGMENTIN   Take 1 Tablet by mouth 2 times a day for 10 days.  Dose: 1 Tablet     esomeprazole 20 MG capsule  Commonly known as: NexIUM   Take 1 Cap by mouth every day. 1/2 HOUR PRIOR TO SAME MEAL DAILY  Dose: 20 mg     fexofenadine 180 MG tablet  Commonly known as: ALLEGRA   Take 1 Tab by mouth every day.  Dose: 180 mg     ibuprofen 200 MG Tabs  Commonly known as: MOTRIN   Take 400 mg by mouth every 6 hours as needed for Mild Pain.  Dose: 400 mg     oxyCODONE-acetaminophen 5-325 MG Tabs  Commonly known as: Percocet   Take 1-2 Tablets by mouth every 6 hours as needed for Moderate Pain (pain) for up to 3 days.  Dose: 1-2 Tablet     Salonpas Pain Relief Patch Ptch   Apply 1 Patch topically 2 times a day as needed (Apply's on right cheek).  Dose: 1 Patch     STOOL SOFTENER PO    Take 1 Capsule by mouth 2 times a day as needed (For constipation).  Dose: 1 Capsule            Allergies  Allergies   Allergen Reactions   • Nkda [No Known Drug Allergy]        DIET  Orders Placed This Encounter   Procedures   • Diet Order Diet: Level 6 - Soft and Bite Sized; Liquid level: Level 0 - Thin     Standing Status:   Standing     Number of Occurrences:   1     Order Specific Question:   Diet:     Answer:   Level 6 - Soft and Bite Sized [23]     Order Specific Question:   Liquid level     Answer:   Level 0 - Thin       ACTIVITY  As tolerated.  Weight bearing as tolerated    CONSULTATIONS  OMFS Dr. Lombardo    PROCEDURES  Listed above    LABORATORY  Lab Results   Component Value Date    SODIUM 133 (L) 07/11/2022    POTASSIUM 4.0 07/11/2022    CHLORIDE 100 07/11/2022    CO2 20 07/11/2022    GLUCOSE 164 (H) 07/11/2022    BUN 6 (L) 07/11/2022    CREATININE 0.79 07/11/2022        Lab Results   Component Value Date    WBC 11.0 (H) 07/11/2022    HEMOGLOBIN 14.5 07/11/2022    HEMATOCRIT 43.7 07/11/2022    PLATELETCT 222 07/11/2022        Total time of the discharge process exceeds 26 minutes.  More than 50% of time was spent face to face with patient.  This included but not limited to review of hospital course with patient, treatment goals upon discharge, recommendations to PCP, continued and new medications and their adverse reactions, and nursing instructions for patient.

## 2022-07-11 NOTE — PROGRESS NOTES
Pt tolerated extractions and I&D well. Excellent prognosis for recovery . When pt is medically stable, OK to dc from OMFS standpoint.  Pt can f/u with my office in 1 week . Recommend dc with Augmentin, pain meds and peridex.      Thank you,  Clemente Lombardo, DDS  [phone]

## 2022-07-11 NOTE — CARE PLAN
The patient is Stable - Low risk of patient condition declining or worsening    Shift Goals  Clinical Goals: Observe BP and pain control  Patient Goals: Rest  Family Goals: Rest    Progress made toward(s) clinical / shift goals:  BP has been within normal limits. Given Oxy 2.5 mg oral.  Patient is resting comfortably in bed.  Knows to use call light for needs.      Patient is not progressing towards the following goals:  N/A.

## 2022-07-11 NOTE — ANESTHESIA TIME REPORT
Anesthesia Start and Stop Event Times     Date Time Event    7/10/2022 1822 Ready for Procedure     1823 Anesthesia Start     1846 Anesthesia Stop        Responsible Staff  07/10/22    Name Role Begin End    Markus Obrien M.D. Anesth 1823 1846        Overtime Reason:  no overtime (within assigned shift)    Comments:

## 2022-07-21 NOTE — DOCUMENTATION QUERY
"                                                                         [facility]                                                                       Query Response Note      PATIENT:               ADOLPH MOTA  ACCT #:                  [id number]  MRN:                     [id number]  :                      1975  ADMIT DATE:       7/10/2022 2:36 PM  DISCH DATE:        2022 9:55 AM  RESPONDING  PROVIDER #:        [id number]           QUERY TEXT:    Based on the clinical findings, your clinical judgement, and after study can the diagnosis of sepsis be further specified?     Note: If an appropriate response is not listed below, please respond with a new note.    Sepsis - real or suspected infection plus 2 or more SIRS criteria  Temp <96.8 or >101  HR >90  RR >20  WBC <4,000 or > 12,000  >10% bandemia         The patient's Clinical Indicators include:  Sepsis is documented as the admission diagnosis for this patient.   He was admitted for complaints of facial and lip swelling since yesterday (22) and notes that it is getting worse.   Noted right upper dental pain on Friday (22) and was seen yesterday (22) in the ED and was started on oral antibiotics.  Now states that there is  a \"puss pocket along the upper gum line\"  SIRS criteria on admission noted as tachycardia > 90 (Pulse noted to be 100 on adm) and leukocytosis > 12 (12.1 on admission).  Source identified as dental infection/perimandibular cellulitis.  Sepsis protocol was initiated.  CT noted:  right premandibular and premaxillary cellulitis, no soft tissue abscess; periodontal disease with dental caries.  Treatment included IV abx and extraction of the affected tooth (#4).   WBC the following morning was noted at 11.0.  Discharge summary noted \"sepsis resolved once the patient's abscess was drained in the OR.\"  Also noted in the dc summary that the sepsis is resolved.      Thank you,  Chani Hampton, RN, BSN  Clinical Documentation " Specialist   [facility]  Connect via [email or web address]  X [phone]  [email or web address]  Options provided:   -- SIRS with periapical abscess, sepsis ruled out   -- SIRS and periapical abscess, sepsis is confirmed   -- Other explanation, Please specify   -- Unable to determine      Query created by: Chani Hampton on 7/20/2022 6:45 PM    RESPONSE TEXT:    SIRS and periapical abscess, sepsis is confirmed          Electronically signed by:  LOUIS BARKSDALE MD 7/20/2022 10:42 PM

## 2022-08-05 ENCOUNTER — TELEPHONE (OUTPATIENT)
Dept: SCHEDULING | Facility: IMAGING CENTER | Age: 47
End: 2022-08-05

## 2022-08-05 SDOH — ECONOMIC STABILITY: TRANSPORTATION INSECURITY
IN THE PAST 12 MONTHS, HAS THE LACK OF TRANSPORTATION KEPT YOU FROM MEDICAL APPOINTMENTS OR FROM GETTING MEDICATIONS?: NO

## 2022-08-05 SDOH — HEALTH STABILITY: PHYSICAL HEALTH: ON AVERAGE, HOW MANY MINUTES DO YOU ENGAGE IN EXERCISE AT THIS LEVEL?: 20 MIN

## 2022-08-05 SDOH — ECONOMIC STABILITY: FOOD INSECURITY: WITHIN THE PAST 12 MONTHS, THE FOOD YOU BOUGHT JUST DIDN'T LAST AND YOU DIDN'T HAVE MONEY TO GET MORE.: NEVER TRUE

## 2022-08-05 SDOH — HEALTH STABILITY: PHYSICAL HEALTH: ON AVERAGE, HOW MANY DAYS PER WEEK DO YOU ENGAGE IN MODERATE TO STRENUOUS EXERCISE (LIKE A BRISK WALK)?: 2 DAYS

## 2022-08-05 SDOH — ECONOMIC STABILITY: TRANSPORTATION INSECURITY
IN THE PAST 12 MONTHS, HAS LACK OF RELIABLE TRANSPORTATION KEPT YOU FROM MEDICAL APPOINTMENTS, MEETINGS, WORK OR FROM GETTING THINGS NEEDED FOR DAILY LIVING?: NO

## 2022-08-05 SDOH — ECONOMIC STABILITY: HOUSING INSECURITY
IN THE LAST 12 MONTHS, WAS THERE A TIME WHEN YOU DID NOT HAVE A STEADY PLACE TO SLEEP OR SLEPT IN A SHELTER (INCLUDING NOW)?: NO

## 2022-08-05 SDOH — ECONOMIC STABILITY: INCOME INSECURITY: IN THE LAST 12 MONTHS, WAS THERE A TIME WHEN YOU WERE NOT ABLE TO PAY THE MORTGAGE OR RENT ON TIME?: NO

## 2022-08-05 SDOH — ECONOMIC STABILITY: HOUSING INSECURITY

## 2022-08-05 SDOH — ECONOMIC STABILITY: FOOD INSECURITY: WITHIN THE PAST 12 MONTHS, YOU WORRIED THAT YOUR FOOD WOULD RUN OUT BEFORE YOU GOT MONEY TO BUY MORE.: NEVER TRUE

## 2022-08-05 SDOH — ECONOMIC STABILITY: TRANSPORTATION INSECURITY
IN THE PAST 12 MONTHS, HAS LACK OF TRANSPORTATION KEPT YOU FROM MEETINGS, WORK, OR FROM GETTING THINGS NEEDED FOR DAILY LIVING?: NO

## 2022-08-05 SDOH — HEALTH STABILITY: MENTAL HEALTH
STRESS IS WHEN SOMEONE FEELS TENSE, NERVOUS, ANXIOUS, OR CAN'T SLEEP AT NIGHT BECAUSE THEIR MIND IS TROUBLED. HOW STRESSED ARE YOU?: RATHER MUCH

## 2022-08-05 ASSESSMENT — LIFESTYLE VARIABLES
HOW MANY STANDARD DRINKS CONTAINING ALCOHOL DO YOU HAVE ON A TYPICAL DAY: 1 OR 2
HOW OFTEN DO YOU HAVE A DRINK CONTAINING ALCOHOL: 2-3 TIMES A WEEK
SKIP TO QUESTIONS 9-10: 1
AUDIT-C TOTAL SCORE: 3
HOW OFTEN DO YOU HAVE SIX OR MORE DRINKS ON ONE OCCASION: NEVER

## 2022-08-05 ASSESSMENT — SOCIAL DETERMINANTS OF HEALTH (SDOH)
HOW OFTEN DO YOU ATTENT MEETINGS OF THE CLUB OR ORGANIZATION YOU BELONG TO?: MORE THAN 4 TIMES PER YEAR
IN A TYPICAL WEEK, HOW MANY TIMES DO YOU TALK ON THE PHONE WITH FAMILY, FRIENDS, OR NEIGHBORS?: MORE THAN THREE TIMES A WEEK
HOW OFTEN DO YOU ATTEND CHURCH OR RELIGIOUS SERVICES?: MORE THAN 4 TIMES PER YEAR
HOW OFTEN DO YOU ATTENT MEETINGS OF THE CLUB OR ORGANIZATION YOU BELONG TO?: MORE THAN 4 TIMES PER YEAR
HOW OFTEN DO YOU ATTEND CHURCH OR RELIGIOUS SERVICES?: MORE THAN 4 TIMES PER YEAR
HOW OFTEN DO YOU GET TOGETHER WITH FRIENDS OR RELATIVES?: MORE THAN THREE TIMES A WEEK
HOW OFTEN DO YOU GET TOGETHER WITH FRIENDS OR RELATIVES?: MORE THAN THREE TIMES A WEEK
HOW OFTEN DO YOU HAVE SIX OR MORE DRINKS ON ONE OCCASION: NEVER
HOW MANY DRINKS CONTAINING ALCOHOL DO YOU HAVE ON A TYPICAL DAY WHEN YOU ARE DRINKING: 1 OR 2
HOW OFTEN DO YOU HAVE A DRINK CONTAINING ALCOHOL: 2-3 TIMES A WEEK
IN A TYPICAL WEEK, HOW MANY TIMES DO YOU TALK ON THE PHONE WITH FAMILY, FRIENDS, OR NEIGHBORS?: MORE THAN THREE TIMES A WEEK
WITHIN THE PAST 12 MONTHS, YOU WORRIED THAT YOUR FOOD WOULD RUN OUT BEFORE YOU GOT THE MONEY TO BUY MORE: NEVER TRUE

## 2022-08-12 ENCOUNTER — OFFICE VISIT (OUTPATIENT)
Dept: MEDICAL GROUP | Facility: MEDICAL CENTER | Age: 47
End: 2022-08-12
Payer: COMMERCIAL

## 2022-08-12 VITALS
HEART RATE: 80 BPM | OXYGEN SATURATION: 100 % | TEMPERATURE: 97.7 F | BODY MASS INDEX: 34.27 KG/M2 | HEIGHT: 69 IN | DIASTOLIC BLOOD PRESSURE: 78 MMHG | WEIGHT: 231.4 LBS | SYSTOLIC BLOOD PRESSURE: 138 MMHG

## 2022-08-12 DIAGNOSIS — E66.9 OBESITY (BMI 30-39.9): ICD-10-CM

## 2022-08-12 DIAGNOSIS — Z00.00 PREVENTATIVE HEALTH CARE: ICD-10-CM

## 2022-08-12 DIAGNOSIS — F90.0 ATTENTION DEFICIT HYPERACTIVITY DISORDER (ADHD), PREDOMINANTLY INATTENTIVE TYPE: ICD-10-CM

## 2022-08-12 DIAGNOSIS — R73.03 PREDIABETES: ICD-10-CM

## 2022-08-12 PROBLEM — L03.90 CELLULITIS: Status: RESOLVED | Noted: 2022-07-10 | Resolved: 2022-08-12

## 2022-08-12 PROBLEM — F90.2 ATTENTION DEFICIT HYPERACTIVITY DISORDER (ADHD), COMBINED TYPE: Status: ACTIVE | Noted: 2022-08-12

## 2022-08-12 PROCEDURE — 99214 OFFICE O/P EST MOD 30 MIN: CPT | Performed by: PHYSICIAN ASSISTANT

## 2022-08-12 RX ORDER — DEXTROAMPHETAMINE SACCHARATE, AMPHETAMINE ASPARTATE, DEXTROAMPHETAMINE SULFATE AND AMPHETAMINE SULFATE 2.5; 2.5; 2.5; 2.5 MG/1; MG/1; MG/1; MG/1
10 TABLET ORAL
Qty: 30 TABLET | Refills: 0 | Status: SHIPPED | OUTPATIENT
Start: 2022-08-12 | End: 2022-10-17 | Stop reason: SDUPTHER

## 2022-08-12 ASSESSMENT — FIBROSIS 4 INDEX: FIB4 SCORE: 0.92

## 2022-08-12 NOTE — PROGRESS NOTES
"Subjective:   Antonio Hutson is a 46 y.o. male here today for ADHD, prediabetes and preventative health care.    Attention deficit hyperactivity disorder (ADHD), predominantly inattentive type  This is a pleasant 46-year-old male who I have not seen for over 3 years.  He was seen by psychiatry and tested positive for inattention.  Currently taking Adderall as needed.  States his psychiatrist moved to [place].  Takes 10 mg on a regular basis but not daily.    Prediabetes  Recently seen at the ER for a dental infection.  Tooth was extracted.  He was noted to have prediabetes with an A1c of 5.7.  He is on a mission to lose weight.  He is also due for other labs.       Current medicines (including changes today)  Current Outpatient Medications   Medication Sig Dispense Refill    amphetamine-dextroamphetamine (ADDERALL) 10 MG Tab Take 1 Tablet by mouth 1 time a day as needed (For ADHD) for up to 30 days. 30 Tablet 0    fexofenadine (ALLEGRA) 180 MG tablet Take 1 Tab by mouth every day. 30 Tab 3    esomeprazole (NEXIUM) 20 MG capsule Take 1 Cap by mouth every day. 1/2 HOUR PRIOR TO SAME MEAL DAILY 30 Cap 5     No current facility-administered medications for this visit.     He  has a past medical history of GERD (gastroesophageal reflux disease).    Social History and Family History were reviewed and updated.    ROS   No chest pain, no shortness of breath, no abdominal pain and all other systems were reviewed and are negative.       Objective:     /78 (BP Location: Right arm, Patient Position: Sitting, BP Cuff Size: Adult)   Pulse 80   Temp 36.5 °C (97.7 °F) (Temporal)   Ht 1.74 m (5' 8.5\")   Wt 105 kg (231 lb 6.4 oz)   SpO2 100%  Body mass index is 34.67 kg/m².   Physical Exam:  Constitutional: Alert, no distress.  Skin: Warm, dry, good turgor, no rashes in visible areas.  Eye: Equal, round and reactive, conjunctiva clear, lids normal.  ENMT: Lips without lesions, good dentition, oropharynx " clear.  Neck: Trachea midline, no masses.   Lymph: No cervical or supraclavicular lymphadenopathy  Respiratory: Unlabored respiratory effort, lungs appear clear.  Psych: Alert and oriented x3, normal affect and mood.        Assessment and Plan:   The following treatment plan was discussed    1. Attention deficit hyperactivity disorder (ADHD), predominantly inattentive type  New condition noted in chart but chronic.   reviewed.  Controlled substance agreement was signed.  Prescribed Adderall 10 mg.  Provided a 30 tablet supply.  If within 3 months he needs a renewal he may contact me through [employer].  - amphetamine-dextroamphetamine (ADDERALL) 10 MG Tab; Take 1 Tablet by mouth 1 time a day as needed (For ADHD) for up to 30 days.  Dispense: 30 Tablet; Refill: 0  - Controlled Substance Treatment Agreement    2. Prediabetes  Chronic condition.  New condition noted in chart.  He will try to lose weight.  Exercise routinely.  Watch carb intake.  Will monitor.    3. Obesity (BMI 30-39.9)  Chronic condition.  Will monitor.  - Patient identified as having weight management issue.  Appropriate orders and counseling given.    4. Preventative health care  Ordered labs.  Fast 8 hours.  He will be contacted with the results.  - CBC WITH DIFFERENTIAL; Future  - Comp Metabolic Panel; Future  - TSH WITH REFLEX TO FT4; Future  - Lipid Profile; Future         Followup: Return in about 3 months (around 11/12/2022), or if symptoms worsen or fail to improve.    Please note that this dictation was created using voice recognition software. I have made every reasonable attempt to correct obvious errors, but I expect that there are errors of grammar and possibly content that I did not discover before finalizing the note.

## 2022-08-12 NOTE — ASSESSMENT & PLAN NOTE
This is a pleasant 46-year-old male who I have not seen for over 3 years.  He was seen by psychiatry and tested positive for inattention.  Currently taking Adderall as needed.  States his psychiatrist moved to [place].  Takes 10 mg on a regular basis but not daily.

## 2022-08-12 NOTE — ASSESSMENT & PLAN NOTE
Recently seen at the ER for a dental infection.  Tooth was extracted.  He was noted to have prediabetes with an A1c of 5.7.  He is on a mission to lose weight.  He is also due for other labs.

## 2022-10-17 ENCOUNTER — PATIENT MESSAGE (OUTPATIENT)
Dept: MEDICAL GROUP | Facility: MEDICAL CENTER | Age: 47
End: 2022-10-17
Payer: COMMERCIAL

## 2022-10-17 DIAGNOSIS — F90.0 ATTENTION DEFICIT HYPERACTIVITY DISORDER (ADHD), PREDOMINANTLY INATTENTIVE TYPE: ICD-10-CM

## 2022-10-17 RX ORDER — DEXTROAMPHETAMINE SACCHARATE, AMPHETAMINE ASPARTATE, DEXTROAMPHETAMINE SULFATE AND AMPHETAMINE SULFATE 2.5; 2.5; 2.5; 2.5 MG/1; MG/1; MG/1; MG/1
10 TABLET ORAL
Qty: 30 TABLET | Refills: 0 | Status: SHIPPED
Start: 2022-10-17 | End: 2023-02-27

## 2022-10-17 NOTE — PATIENT COMMUNICATION
Cangrelor Bolus completed Received request via: Patient    Was the patient seen in the last year in this department? Yes    Does the patient have an active prescription (recently filled or refills available) for medication(s) requested? No

## 2022-11-15 ENCOUNTER — HOSPITAL ENCOUNTER (OUTPATIENT)
Dept: LAB | Facility: MEDICAL CENTER | Age: 47
End: 2022-11-15
Attending: PHYSICIAN ASSISTANT
Payer: COMMERCIAL

## 2022-11-15 DIAGNOSIS — Z00.00 PREVENTATIVE HEALTH CARE: ICD-10-CM

## 2022-11-15 PROBLEM — E78.00 PURE HYPERCHOLESTEROLEMIA: Status: ACTIVE | Noted: 2022-11-15

## 2022-11-15 LAB
ALBUMIN SERPL BCP-MCNC: 4.7 G/DL (ref 3.2–4.9)
ALBUMIN/GLOB SERPL: 1.7 G/DL
ALP SERPL-CCNC: 86 U/L (ref 30–99)
ALT SERPL-CCNC: 34 U/L (ref 2–50)
ANION GAP SERPL CALC-SCNC: 13 MMOL/L (ref 7–16)
AST SERPL-CCNC: 16 U/L (ref 12–45)
BASOPHILS # BLD AUTO: 0.8 % (ref 0–1.8)
BASOPHILS # BLD: 0.06 K/UL (ref 0–0.12)
BILIRUB SERPL-MCNC: 0.5 MG/DL (ref 0.1–1.5)
BUN SERPL-MCNC: 12 MG/DL (ref 8–22)
CALCIUM SERPL-MCNC: 9.6 MG/DL (ref 8.4–10.2)
CHLORIDE SERPL-SCNC: 102 MMOL/L (ref 96–112)
CHOLEST SERPL-MCNC: 218 MG/DL (ref 100–199)
CO2 SERPL-SCNC: 22 MMOL/L (ref 20–33)
CREAT SERPL-MCNC: 0.85 MG/DL (ref 0.5–1.4)
EOSINOPHIL # BLD AUTO: 0.05 K/UL (ref 0–0.51)
EOSINOPHIL NFR BLD: 0.7 % (ref 0–6.9)
ERYTHROCYTE [DISTWIDTH] IN BLOOD BY AUTOMATED COUNT: 42 FL (ref 35.9–50)
FASTING STATUS PATIENT QL REPORTED: NORMAL
GFR SERPLBLD CREATININE-BSD FMLA CKD-EPI: 107 ML/MIN/1.73 M 2
GLOBULIN SER CALC-MCNC: 2.8 G/DL (ref 1.9–3.5)
GLUCOSE SERPL-MCNC: 98 MG/DL (ref 65–99)
HCT VFR BLD AUTO: 47.6 % (ref 42–52)
HDLC SERPL-MCNC: 50 MG/DL
HGB BLD-MCNC: 15.4 G/DL (ref 14–18)
IMM GRANULOCYTES # BLD AUTO: 0.02 K/UL (ref 0–0.11)
IMM GRANULOCYTES NFR BLD AUTO: 0.3 % (ref 0–0.9)
LDLC SERPL CALC-MCNC: 136 MG/DL
LYMPHOCYTES # BLD AUTO: 1.78 K/UL (ref 1–4.8)
LYMPHOCYTES NFR BLD: 24.7 % (ref 22–41)
MCH RBC QN AUTO: 28.2 PG (ref 27–33)
MCHC RBC AUTO-ENTMCNC: 32.4 G/DL (ref 33.7–35.3)
MCV RBC AUTO: 87.2 FL (ref 81.4–97.8)
MONOCYTES # BLD AUTO: 0.45 K/UL (ref 0–0.85)
MONOCYTES NFR BLD AUTO: 6.3 % (ref 0–13.4)
NEUTROPHILS # BLD AUTO: 4.84 K/UL (ref 1.82–7.42)
NEUTROPHILS NFR BLD: 67.2 % (ref 44–72)
NRBC # BLD AUTO: 0 K/UL
NRBC BLD-RTO: 0 /100 WBC
PLATELET # BLD AUTO: 261 K/UL (ref 164–446)
PMV BLD AUTO: 10.6 FL (ref 9–12.9)
POTASSIUM SERPL-SCNC: 3.9 MMOL/L (ref 3.6–5.5)
PROT SERPL-MCNC: 7.5 G/DL (ref 6–8.2)
RBC # BLD AUTO: 5.46 M/UL (ref 4.7–6.1)
SODIUM SERPL-SCNC: 137 MMOL/L (ref 135–145)
TRIGL SERPL-MCNC: 158 MG/DL (ref 0–149)
TSH SERPL DL<=0.005 MIU/L-ACNC: 1.71 UIU/ML (ref 0.38–5.33)
WBC # BLD AUTO: 7.2 K/UL (ref 4.8–10.8)

## 2022-11-15 PROCEDURE — 80061 LIPID PANEL: CPT

## 2022-11-15 PROCEDURE — 84443 ASSAY THYROID STIM HORMONE: CPT

## 2022-11-15 PROCEDURE — 85025 COMPLETE CBC W/AUTO DIFF WBC: CPT

## 2022-11-15 PROCEDURE — 36415 COLL VENOUS BLD VENIPUNCTURE: CPT

## 2022-11-15 PROCEDURE — 80053 COMPREHEN METABOLIC PANEL: CPT

## 2022-11-21 ENCOUNTER — OFFICE VISIT (OUTPATIENT)
Dept: MEDICAL GROUP | Facility: MEDICAL CENTER | Age: 47
End: 2022-11-21
Payer: COMMERCIAL

## 2022-11-21 VITALS
DIASTOLIC BLOOD PRESSURE: 80 MMHG | OXYGEN SATURATION: 97 % | WEIGHT: 236.6 LBS | HEIGHT: 69 IN | TEMPERATURE: 97.5 F | HEART RATE: 72 BPM | BODY MASS INDEX: 35.04 KG/M2 | SYSTOLIC BLOOD PRESSURE: 126 MMHG

## 2022-11-21 DIAGNOSIS — Z11.59 ENCOUNTER FOR HEPATITIS C SCREENING TEST FOR LOW RISK PATIENT: ICD-10-CM

## 2022-11-21 DIAGNOSIS — H93.12 TINNITUS OF LEFT EAR: ICD-10-CM

## 2022-11-21 DIAGNOSIS — Z00.00 PREVENTATIVE HEALTH CARE: ICD-10-CM

## 2022-11-21 DIAGNOSIS — L98.9 SCALP LESION: ICD-10-CM

## 2022-11-21 DIAGNOSIS — E78.00 PURE HYPERCHOLESTEROLEMIA: ICD-10-CM

## 2022-11-21 DIAGNOSIS — R73.03 PREDIABETES: ICD-10-CM

## 2022-11-21 DIAGNOSIS — Z23 NEED FOR IMMUNIZATION AGAINST INFLUENZA: ICD-10-CM

## 2022-11-21 DIAGNOSIS — R53.82 CHRONIC FATIGUE: ICD-10-CM

## 2022-11-21 DIAGNOSIS — H91.93 DECREASED HEARING OF BOTH EARS: ICD-10-CM

## 2022-11-21 DIAGNOSIS — Z12.11 COLON CANCER SCREENING: ICD-10-CM

## 2022-11-21 PROCEDURE — 90471 IMMUNIZATION ADMIN: CPT | Performed by: PHYSICIAN ASSISTANT

## 2022-11-21 PROCEDURE — 90686 IIV4 VACC NO PRSV 0.5 ML IM: CPT | Performed by: PHYSICIAN ASSISTANT

## 2022-11-21 PROCEDURE — 99214 OFFICE O/P EST MOD 30 MIN: CPT | Mod: 25 | Performed by: PHYSICIAN ASSISTANT

## 2022-11-21 ASSESSMENT — FIBROSIS 4 INDEX: FIB4 SCORE: 0.5

## 2022-11-21 NOTE — ASSESSMENT & PLAN NOTE
Complains of corns on his head.  2 lesions.  Sometimes the area will flare with pustules.  Does have a history of having shingles.

## 2022-11-21 NOTE — PROGRESS NOTES
"Subjective:   Antonio Hutson is a 47 y.o. male here today for chronic tinnitus of the left ear with decreased hearing, history of prediabetes and elevated cholesterol.    Tinnitus of left ear  This is a pleasant 47-year-old male here today to follow-up on his health.  Requesting a referral to see otolaryngology.  History of tinnitus in the left ear.  Sometimes it becomes so severe.  He also complains of decreased hearing bilaterally.  Tinnitus does affect his hearing.    Prediabetes  Recent glucose level was at 98.  I do not see an A1c drawn.    Pure hypercholesterolemia  Chronic condition.  Last cholesterol profile with an LDL at 136.  No known history of CAD.    Scalp lesion  Complains of corns on his head.  2 lesions.  Sometimes the area will flare with pustules.  Does have a history of having shingles.       Current medicines (including changes today)  Current Outpatient Medications   Medication Sig Dispense Refill    fexofenadine (ALLEGRA) 180 MG tablet Take 1 Tab by mouth every day. 30 Tab 3    esomeprazole (NEXIUM) 20 MG capsule Take 1 Cap by mouth every day. 1/2 HOUR PRIOR TO SAME MEAL DAILY 30 Cap 5     No current facility-administered medications for this visit.     He  has a past medical history of GERD (gastroesophageal reflux disease).    Social History and Family History were reviewed and updated.    ROS   No chest pain, no shortness of breath, no abdominal pain and all other systems were reviewed and are negative.       Objective:     /80 (BP Location: Right arm, Patient Position: Sitting, BP Cuff Size: Adult)   Pulse 72   Temp 36.4 °C (97.5 °F) (Temporal)   Ht 1.74 m (5' 8.5\")   Wt 107 kg (236 lb 9.6 oz)   SpO2 97%  Body mass index is 35.45 kg/m².   Physical Exam:  Constitutional: Alert, no distress.  Skin: Warm, dry, good turgor, no rashes in visible areas.  Scalp without any pustular lesions but there does appear to be two symmetrically placed lesions that are hard and and raised " and whitish.  Approximate 1 mm in diameter.  Eye: Equal, round and reactive, conjunctiva clear, lids normal.  ENMT: Lips without lesions, good dentition, oropharynx clear.  Neck: Trachea midline, no masses.   Lymph: No cervical or supraclavicular lymphadenopathy  Respiratory: Unlabored respiratory effort, lungs appear clear.  Psych: Alert and oriented x3, normal affect and mood.        Assessment and Plan:   The following treatment plan was discussed    1. Tinnitus of left ear  Chronic condition.  Stable.  Referred to ENT per patient's request.  Unknown if there is any treatment process.  - Referral to ENT    2. Decreased hearing of both ears  Chronic condition.  Refer to ENT.  - Referral to ENT    3. Pure hypercholesterolemia  Chronic condition.  Discussed CT cardiac scoring.  Ordered.  Contact imaging for appointment.  - CT-CARDIAC SCORING; Future    4. Chronic fatigue  Chronic condition.  Ordered iron and testosterone profile.  History of anemia.  - IRON/TOTAL IRON BIND; Future  - Testosterone, Free & Total, Adult Male (w/SHBG); Future    5. Scalp lesion  Chronic condition.  Asymptomatic.  Stable looking lesions on his scalp.  He states next time that there are flares in that area he will follow-up with his wife dermatologist.    6. Preventative health care  A1c ordered.  History of prediabetes.  - HEMOGLOBIN A1C; Future    7. Colon cancer screening  Referred to [facility] for colon cancer screening.  - Referral to GI for Colonoscopy    8. Encounter for hepatitis C screening test for low risk patient  Hep C viral antibody ordered.  Low risk.  - HEP C VIRUS ANTIBODY; Future    9. Need for immunization against influenza  Administer without complaints.  - INFLUENZA VACCINE QUAD INJ (PF)    10. Prediabetes  Prior condition.  A1c ordered.  Recent glucose level fasting at 89.         Followup: Return if symptoms worsen or fail to improve.    Please note that this dictation was created using voice recognition software. I have  made every reasonable attempt to correct obvious errors, but I expect that there are errors of grammar and possibly content that I did not discover before finalizing the note.

## 2022-11-21 NOTE — ASSESSMENT & PLAN NOTE
This is a pleasant 47-year-old male here today to follow-up on his health.  Requesting a referral to see otolaryngology.  History of tinnitus in the left ear.  Sometimes it becomes so severe.  He also complains of decreased hearing bilaterally.  Tinnitus does affect his hearing.

## 2023-01-05 ENCOUNTER — HOSPITAL ENCOUNTER (OUTPATIENT)
Dept: LAB | Facility: MEDICAL CENTER | Age: 48
End: 2023-01-05
Attending: PHYSICIAN ASSISTANT
Payer: COMMERCIAL

## 2023-01-05 DIAGNOSIS — Z00.00 PREVENTATIVE HEALTH CARE: ICD-10-CM

## 2023-01-05 DIAGNOSIS — Z11.59 ENCOUNTER FOR HEPATITIS C SCREENING TEST FOR LOW RISK PATIENT: ICD-10-CM

## 2023-01-05 DIAGNOSIS — R53.82 CHRONIC FATIGUE: ICD-10-CM

## 2023-01-05 LAB
EST. AVERAGE GLUCOSE BLD GHB EST-MCNC: 111 MG/DL
HBA1C MFR BLD: 5.5 % (ref 4–5.6)
HCV AB SER QL: NORMAL
IRON SATN MFR SERPL: 30 % (ref 15–55)
IRON SERPL-MCNC: 88 UG/DL (ref 50–180)
TIBC SERPL-MCNC: 296 UG/DL (ref 250–450)
UIBC SERPL-MCNC: 208 UG/DL (ref 110–370)

## 2023-01-05 PROCEDURE — 36415 COLL VENOUS BLD VENIPUNCTURE: CPT

## 2023-01-05 PROCEDURE — 84402 ASSAY OF FREE TESTOSTERONE: CPT

## 2023-01-05 PROCEDURE — 84270 ASSAY OF SEX HORMONE GLOBUL: CPT

## 2023-01-05 PROCEDURE — 86803 HEPATITIS C AB TEST: CPT

## 2023-01-05 PROCEDURE — 83540 ASSAY OF IRON: CPT

## 2023-01-05 PROCEDURE — 83036 HEMOGLOBIN GLYCOSYLATED A1C: CPT

## 2023-01-05 PROCEDURE — 84403 ASSAY OF TOTAL TESTOSTERONE: CPT

## 2023-01-05 PROCEDURE — 83550 IRON BINDING TEST: CPT

## 2023-01-06 ENCOUNTER — HOSPITAL ENCOUNTER (OUTPATIENT)
Dept: RADIOLOGY | Facility: MEDICAL CENTER | Age: 48
End: 2023-01-06
Attending: PHYSICIAN ASSISTANT
Payer: COMMERCIAL

## 2023-01-06 DIAGNOSIS — E78.00 PURE HYPERCHOLESTEROLEMIA: ICD-10-CM

## 2023-01-06 PROBLEM — R93.1 AGATSTON CORONARY ARTERY CALCIUM SCORE BETWEEN 100 AND 199: Status: ACTIVE | Noted: 2023-01-06

## 2023-01-06 PROCEDURE — 4410556 CT-CARDIAC SCORING (SELF PAY ONLY)

## 2023-01-09 DIAGNOSIS — R79.89 LOW TESTOSTERONE: ICD-10-CM

## 2023-01-09 LAB
SHBG SERPL-SCNC: 35 NMOL/L (ref 17–56)
TESTOST FREE MFR SERPL: 1.7 % (ref 1.6–2.9)
TESTOST FREE SERPL-MCNC: 52 PG/ML (ref 47–244)
TESTOST SERPL-MCNC: 300 NG/DL (ref 300–890)

## 2023-01-10 ENCOUNTER — OFFICE VISIT (OUTPATIENT)
Dept: MEDICAL GROUP | Facility: MEDICAL CENTER | Age: 48
End: 2023-01-10
Payer: COMMERCIAL

## 2023-01-10 VITALS
WEIGHT: 236.6 LBS | HEART RATE: 86 BPM | DIASTOLIC BLOOD PRESSURE: 80 MMHG | TEMPERATURE: 98.8 F | OXYGEN SATURATION: 94 % | SYSTOLIC BLOOD PRESSURE: 124 MMHG | HEIGHT: 69 IN | BODY MASS INDEX: 35.04 KG/M2

## 2023-01-10 DIAGNOSIS — E78.00 PURE HYPERCHOLESTEROLEMIA: ICD-10-CM

## 2023-01-10 DIAGNOSIS — E66.01 MORBID OBESITY DUE TO EXCESS CALORIES (HCC): ICD-10-CM

## 2023-01-10 DIAGNOSIS — R93.1 AGATSTON CORONARY ARTERY CALCIUM SCORE BETWEEN 100 AND 199: ICD-10-CM

## 2023-01-10 DIAGNOSIS — R79.89 LOW TESTOSTERONE: ICD-10-CM

## 2023-01-10 DIAGNOSIS — R73.03 PREDIABETES: ICD-10-CM

## 2023-01-10 PROCEDURE — 99214 OFFICE O/P EST MOD 30 MIN: CPT | Performed by: PHYSICIAN ASSISTANT

## 2023-01-10 RX ORDER — ATORVASTATIN CALCIUM 20 MG/1
20 TABLET, FILM COATED ORAL NIGHTLY
Qty: 30 TABLET | Refills: 2 | Status: SHIPPED | OUTPATIENT
Start: 2023-01-10 | End: 2023-02-09

## 2023-01-10 ASSESSMENT — FIBROSIS 4 INDEX: FIB4 SCORE: 0.5

## 2023-01-10 ASSESSMENT — PATIENT HEALTH QUESTIONNAIRE - PHQ9: CLINICAL INTERPRETATION OF PHQ2 SCORE: 0

## 2023-01-10 NOTE — ASSESSMENT & PLAN NOTE
CT calcium score was elevated.  At 129 which placed him in the greater than 75th percentile.  LDL in the 130s.  Total cholesterol elevated.  He is willing to start medication to help with reduction of his risk for heart disease.

## 2023-01-10 NOTE — ASSESSMENT & PLAN NOTE
Is a pleasant 47-year-old male here today to follow-up on labs.  A1c did improve but does have prediabetes.  He is interested in starting Ozempic or Jose Luis O.  He states he believe that this will help with weight loss as well as his eminent future with diabetes.

## 2023-01-10 NOTE — PROGRESS NOTES
"Subjective:   Antonio Hutson is a 47 y.o. male here today for prediabetes, hypercholesterolemia and low testosterone.    Prediabetes  Is a pleasant 47-year-old male here today to follow-up on labs.  A1c did improve but does have prediabetes.  He is interested in starting Ozempic or Jose Luis O.  He states he believe that this will help with weight loss as well as his eminent future with diabetes.    Agatston coronary artery calcium score between 100 and 199  CT calcium score was elevated.  At 129 which placed him in the greater than 75th percentile.  LDL in the 130s.  Total cholesterol elevated.  He is willing to start medication to help with reduction of his risk for heart disease.    Low testosterone  Testosterone level is low at 300.       Current medicines (including changes today)  Current Outpatient Medications   Medication Sig Dispense Refill    Semaglutide,0.25 or 0.5MG/DOS, 2 MG/1.5ML Solution Pen-injector Inject 0.25 mg under the skin every 7 days. 1.5 mL 1    atorvastatin (LIPITOR) 20 MG Tab Take 1 Tablet by mouth every evening for 30 days. 30 Tablet 2    fexofenadine (ALLEGRA) 180 MG tablet Take 1 Tab by mouth every day. 30 Tab 3    esomeprazole (NEXIUM) 20 MG capsule Take 1 Cap by mouth every day. 1/2 HOUR PRIOR TO SAME MEAL DAILY 30 Cap 5     No current facility-administered medications for this visit.     He  has a past medical history of GERD (gastroesophageal reflux disease).    Social History and Family History were reviewed and updated.    ROS   No chest pain, no shortness of breath, no abdominal pain and all other systems were reviewed and are negative.       Objective:     /80 (BP Location: Right arm, Patient Position: Sitting, BP Cuff Size: Adult)   Pulse 86   Temp 37.1 °C (98.8 °F) (Temporal)   Ht 1.74 m (5' 8.5\")   Wt 107 kg (236 lb 9.6 oz)   SpO2 94%  Body mass index is 35.45 kg/m².   Physical Exam:  Constitutional: Alert, no distress.  Skin: Warm, dry, good turgor, no rashes " in visible areas.  Eye: Equal, round and reactive, conjunctiva clear, lids normal.  ENMT: Lips without lesions, good dentition, oropharynx clear.  Neck: Trachea midline, no masses.   Lymph: No cervical or supraclavicular lymphadenopathy  Respiratory: Unlabored respiratory effort, lungs appear clear.  Psych: Alert and oriented x3, normal affect and mood.        Assessment and Plan:   The following treatment plan was discussed    1. Prediabetes  Chronic condition.  Stable.  Continue lifestyle modifications.  Prescribed semaglutide 8.25 mg weekly.  Contact me in 4 weeks to get an increased dose of 0.5 mg.  Discussed side effects.  - Semaglutide,0.25 or 0.5MG/DOS, 2 MG/1.5ML Solution Pen-injector; Inject 0.25 mg under the skin every 7 days.  Dispense: 1.5 mL; Refill: 1    2. Agatston coronary artery calcium score between 100 and 199  Reviewed CT results.  We will place him on Lipitor.    3. Pure hypercholesterolemia  Chronic condition.  Uncontrolled.  Placed him on Lipitor 20 mg.  Check cholesterol and hepatic function test in 1 month.  Fast 8 hours.  - Lipid Profile; Future  - HEPATIC FUNCTION PANEL; Future  - atorvastatin (LIPITOR) 20 MG Tab; Take 1 Tablet by mouth every evening for 30 days.  Dispense: 30 Tablet; Refill: 2    4. Low testosterone  Reviewed recent testosterone level at 300.  Check in 1 month.  Perform between 7 and 9 AM.    5. Morbid obesity due to excess calories (HCC)  Chronic condition.  We will start Ozempic as directed.  Hopefully this will further help and with weight loss.  - Semaglutide,0.25 or 0.5MG/DOS, 2 MG/1.5ML Solution Pen-injector; Inject 0.25 mg under the skin every 7 days.  Dispense: 1.5 mL; Refill: 1         Followup: Return if symptoms worsen or fail to improve.    Please note that this dictation was created using voice recognition software. I have made every reasonable attempt to correct obvious errors, but I expect that there are errors of grammar and possibly content that I did not  discover before finalizing the note.

## 2023-02-23 ENCOUNTER — HOSPITAL ENCOUNTER (OUTPATIENT)
Dept: LAB | Facility: MEDICAL CENTER | Age: 48
End: 2023-02-23
Attending: PHYSICIAN ASSISTANT
Payer: COMMERCIAL

## 2023-02-23 DIAGNOSIS — E78.00 PURE HYPERCHOLESTEROLEMIA: ICD-10-CM

## 2023-02-23 DIAGNOSIS — R79.89 LOW TESTOSTERONE: ICD-10-CM

## 2023-02-23 LAB
ALBUMIN SERPL BCP-MCNC: 4.3 G/DL (ref 3.2–4.9)
ALP SERPL-CCNC: 83 U/L (ref 30–99)
ALT SERPL-CCNC: 42 U/L (ref 2–50)
AST SERPL-CCNC: 21 U/L (ref 12–45)
BILIRUB CONJ SERPL-MCNC: <0.2 MG/DL (ref 0.1–0.5)
BILIRUB INDIRECT SERPL-MCNC: NORMAL MG/DL (ref 0–1)
BILIRUB SERPL-MCNC: 0.7 MG/DL (ref 0.1–1.5)
CHOLEST SERPL-MCNC: 216 MG/DL (ref 100–199)
FASTING STATUS PATIENT QL REPORTED: NORMAL
HDLC SERPL-MCNC: 50 MG/DL
LDLC SERPL CALC-MCNC: 138 MG/DL
PROT SERPL-MCNC: 7.2 G/DL (ref 6–8.2)
TRIGL SERPL-MCNC: 140 MG/DL (ref 0–149)

## 2023-02-23 PROCEDURE — 84403 ASSAY OF TOTAL TESTOSTERONE: CPT

## 2023-02-23 PROCEDURE — 80061 LIPID PANEL: CPT

## 2023-02-23 PROCEDURE — 84402 ASSAY OF FREE TESTOSTERONE: CPT

## 2023-02-23 PROCEDURE — 84270 ASSAY OF SEX HORMONE GLOBUL: CPT

## 2023-02-23 PROCEDURE — 36415 COLL VENOUS BLD VENIPUNCTURE: CPT

## 2023-02-23 PROCEDURE — 80076 HEPATIC FUNCTION PANEL: CPT

## 2023-02-25 LAB
SHBG SERPL-SCNC: 35 NMOL/L (ref 17–56)
TESTOST FREE MFR SERPL: 1.8 % (ref 1.6–2.9)
TESTOST FREE SERPL-MCNC: 79 PG/ML (ref 47–244)
TESTOST SERPL-MCNC: 437 NG/DL (ref 300–890)

## 2023-02-27 ENCOUNTER — HOSPITAL ENCOUNTER (OUTPATIENT)
Facility: MEDICAL CENTER | Age: 48
End: 2023-02-27
Attending: PHYSICIAN ASSISTANT
Payer: COMMERCIAL

## 2023-02-27 ENCOUNTER — OFFICE VISIT (OUTPATIENT)
Dept: MEDICAL GROUP | Facility: MEDICAL CENTER | Age: 48
End: 2023-02-27
Payer: COMMERCIAL

## 2023-02-27 VITALS
HEART RATE: 77 BPM | TEMPERATURE: 97.3 F | OXYGEN SATURATION: 98 % | HEIGHT: 69 IN | DIASTOLIC BLOOD PRESSURE: 78 MMHG | SYSTOLIC BLOOD PRESSURE: 138 MMHG | BODY MASS INDEX: 35.79 KG/M2 | WEIGHT: 241.62 LBS

## 2023-02-27 DIAGNOSIS — R73.03 PREDIABETES: ICD-10-CM

## 2023-02-27 DIAGNOSIS — E78.00 PURE HYPERCHOLESTEROLEMIA: ICD-10-CM

## 2023-02-27 DIAGNOSIS — F90.0 ATTENTION DEFICIT HYPERACTIVITY DISORDER (ADHD), PREDOMINANTLY INATTENTIVE TYPE: ICD-10-CM

## 2023-02-27 PROCEDURE — 99214 OFFICE O/P EST MOD 30 MIN: CPT | Performed by: PHYSICIAN ASSISTANT

## 2023-02-27 PROCEDURE — G0481 DRUG TEST DEF 8-14 CLASSES: HCPCS

## 2023-02-27 RX ORDER — DEXTROAMPHETAMINE SACCHARATE, AMPHETAMINE ASPARTATE, DEXTROAMPHETAMINE SULFATE AND AMPHETAMINE SULFATE 2.5; 2.5; 2.5; 2.5 MG/1; MG/1; MG/1; MG/1
10 TABLET ORAL
Qty: 30 TABLET | Refills: 0 | Status: SHIPPED | OUTPATIENT
Start: 2023-04-28 | End: 2023-02-27 | Stop reason: SDUPTHER

## 2023-02-27 RX ORDER — DEXTROAMPHETAMINE SACCHARATE, AMPHETAMINE ASPARTATE, DEXTROAMPHETAMINE SULFATE AND AMPHETAMINE SULFATE 2.5; 2.5; 2.5; 2.5 MG/1; MG/1; MG/1; MG/1
10 TABLET ORAL 2 TIMES DAILY
Qty: 60 TABLET | Refills: 0 | Status: SHIPPED | OUTPATIENT
Start: 2023-02-27 | End: 2023-02-27 | Stop reason: SDUPTHER

## 2023-02-27 RX ORDER — DEXTROAMPHETAMINE SACCHARATE, AMPHETAMINE ASPARTATE, DEXTROAMPHETAMINE SULFATE AND AMPHETAMINE SULFATE 2.5; 2.5; 2.5; 2.5 MG/1; MG/1; MG/1; MG/1
10 TABLET ORAL
Qty: 30 TABLET | Refills: 0 | Status: SHIPPED | OUTPATIENT
Start: 2023-03-29 | End: 2023-02-27 | Stop reason: SDUPTHER

## 2023-02-27 RX ORDER — DEXTROAMPHETAMINE SACCHARATE, AMPHETAMINE ASPARTATE, DEXTROAMPHETAMINE SULFATE AND AMPHETAMINE SULFATE 2.5; 2.5; 2.5; 2.5 MG/1; MG/1; MG/1; MG/1
10 TABLET ORAL 2 TIMES DAILY
Qty: 60 TABLET | Refills: 0 | Status: SHIPPED | OUTPATIENT
Start: 2023-02-27 | End: 2023-03-29

## 2023-02-27 RX ORDER — DEXTROAMPHETAMINE SACCHARATE, AMPHETAMINE ASPARTATE, DEXTROAMPHETAMINE SULFATE AND AMPHETAMINE SULFATE 2.5; 2.5; 2.5; 2.5 MG/1; MG/1; MG/1; MG/1
10 TABLET ORAL
Qty: 30 TABLET | Refills: 0 | Status: SHIPPED | OUTPATIENT
Start: 2023-04-28 | End: 2023-05-28

## 2023-02-27 RX ORDER — DEXTROAMPHETAMINE SACCHARATE, AMPHETAMINE ASPARTATE, DEXTROAMPHETAMINE SULFATE AND AMPHETAMINE SULFATE 2.5; 2.5; 2.5; 2.5 MG/1; MG/1; MG/1; MG/1
10 TABLET ORAL
Qty: 30 TABLET | Refills: 0 | Status: SHIPPED | OUTPATIENT
Start: 2023-03-29 | End: 2023-04-28

## 2023-02-27 RX ORDER — ATORVASTATIN CALCIUM 20 MG/1
20 TABLET, FILM COATED ORAL NIGHTLY
COMMUNITY
End: 2023-06-26

## 2023-02-27 ASSESSMENT — FIBROSIS 4 INDEX: FIB4 SCORE: 0.6

## 2023-02-27 NOTE — ASSESSMENT & PLAN NOTE
This is a pleasant 47-year-old male here today to follow-up on his health.  Requesting a refill today of Adderall.  10 mg daily.  Previously seen by psychiatry.  Medication is effective.  Stopped the medication a few months ago but needs to get back on it.  Contact me through [employer] but advised him to come in for a urine drug screen.  Fortunately was unable to receive Ozempic.  He was told it was only because of his obesity.  Does have prediabetes.  Is concerned about him having diabetes in the future.  There is a strong family history of it.

## 2023-02-27 NOTE — PROGRESS NOTES
Subjective:   Antonio Hutson is a 47 y.o. male here today for ADHD and hypercholesterolemia.    Attention deficit hyperactivity disorder (ADHD), predominantly inattentive type  This is a pleasant 47-year-old male here today to follow-up on his health.  Requesting a refill today of Adderall.  10 mg daily.  Previously seen by psychiatry.  Medication is effective.  Stopped the medication a few months ago but needs to get back on it.  Contact me through [employer] but advised him to come in for a urine drug screen.  Fortunately was unable to receive Ozempic.  He was told it was only because of his obesity.  Does have prediabetes.  Is concerned about him having diabetes in the future.  There is a strong family history of it.    Pure hypercholesterolemia  Chronic condition.  Restarted atorvastatin 20 mg.  Cholesterol profile recently elevated.  Was not taking the medication.  Abnormal CT calcium score.       Current medicines (including changes today)  Current Outpatient Medications   Medication Sig Dispense Refill    atorvastatin (LIPITOR) 20 MG Tab Take 20 mg by mouth every evening.      [START ON 4/28/2023] amphetamine-dextroamphetamine (ADDERALL) 10 MG Tab Take 1 Tablet by mouth 1 time a day as needed (For ADHD) for up to 30 days. 30 Tablet 0    [START ON 3/29/2023] amphetamine-dextroamphetamine (ADDERALL) 10 MG Tab Take 1 Tablet by mouth 1 time a day as needed (For ADHD) for up to 30 days. 30 Tablet 0    amphetamine-dextroamphetamine (ADDERALL) 10 MG Tab Take 1 Tablet by mouth 2 times a day for 30 days. 60 Tablet 0    Semaglutide,0.25 or 0.5MG/DOS, 2 MG/1.5ML Solution Pen-injector Inject 0.25 mg under the skin every 7 days. 1.5 mL 1    fexofenadine (ALLEGRA) 180 MG tablet Take 1 Tab by mouth every day. 30 Tab 3    esomeprazole (NEXIUM) 20 MG capsule Take 1 Cap by mouth every day. 1/2 HOUR PRIOR TO SAME MEAL DAILY 30 Cap 5     No current facility-administered medications for this visit.     He  has a past medical  "history of GERD (gastroesophageal reflux disease).    Social History and Family History were reviewed and updated.    ROS   No chest pain, no shortness of breath, no abdominal pain and all other systems were reviewed and are negative.       Objective:     /78 (BP Location: Left arm, Patient Position: Sitting, BP Cuff Size: Adult)   Pulse 77   Temp 36.3 °C (97.3 °F) (Temporal)   Ht 1.74 m (5' 8.5\")   Wt 110 kg (241 lb 10 oz)   SpO2 98%  Body mass index is 36.2 kg/m².   Physical Exam:  Constitutional: Alert, no distress.  Skin: Warm, dry, good turgor, no rashes in visible areas.  Eye: Equal, round and reactive, conjunctiva clear, lids normal.  ENMT: Lips without lesions, good dentition, oropharynx clear.  Neck: Trachea midline, no masses.   Lymph: No cervical or supraclavicular lymphadenopathy  Respiratory: Unlabored respiratory effort, lungs appear clear.  Psych: Alert and oriented x3, normal affect and mood.        Assessment and Plan:   The following treatment plan was discussed    1. Attention deficit hyperactivity disorder (ADHD), predominantly inattentive type  Chronic condition.  Currently not on medication.  Renewed Adderall at 10 mg daily.   reviewed.  Controlled substance agreement was signed.  Urine drug screen collected.  Sent over a 90-day supply.  Separate refills.  - amphetamine-dextroamphetamine (ADDERALL) 10 MG Tab; Take 1 Tablet by mouth 1 time a day as needed (For ADHD) for up to 30 days.  Dispense: 30 Tablet; Refill: 0  - amphetamine-dextroamphetamine (ADDERALL) 10 MG Tab; Take 1 Tablet by mouth 1 time a day as needed (For ADHD) for up to 30 days.  Dispense: 30 Tablet; Refill: 0  - amphetamine-dextroamphetamine (ADDERALL) 10 MG Tab; Take 1 Tablet by mouth 2 times a day for 30 days.  Dispense: 60 Tablet; Refill: 0  - Controlled Substance Treatment Agreement  - PAIN MANAGEMENT SCRN, UR; Future    2. Pure hypercholesterolemia  Chronic condition.  Uncontrolled.  We will check cholesterol " profile and follow-up.  Continue atorvastatin 20 mg.  - Lipid Profile; Future    3. Prediabetes  Chronic condition.  Will check A1c in April.  Status unknown.  - HEMOGLOBIN A1C; Future         Followup: Return in about 3 months (around 5/27/2023), or if symptoms worsen or fail to improve.    Please note that this dictation was created using voice recognition software. I have made every reasonable attempt to correct obvious errors, but I expect that there are errors of grammar and possibly content that I did not discover before finalizing the note.

## 2023-02-27 NOTE — ASSESSMENT & PLAN NOTE
"Patient Name: Sandhya Madrigal     Date: 05/01/2019    Patient Verification: Two stated identifiers    Laser Safety Checklist: N/A    Procedure: Sclerotherapy BLE        Pre-op Vitals:  BP: 136 / 83  HR:58 bpm Height: 5'7" Weight: 141 lbs       [x] Allergies Reviewed  [x] Medications Reviewed  [x] Consent Reviewed & Signed       Time Out:     Correct patient: Yes       Correct procedure: Yes   Correct site (marked): Yes     Correct Position: Yes  supine  Correct Laterality: Yes -  Bilateral  Time completed: 225pm    Procedure Start Time: 230pm    Position of Patient::  Supine to prone    Treatment Solutions:      Asclera: Yes; 1 %; 10 total cc   (Solution of 2cc of 1% Asclera,  2cc of Sterile Water)      Ultrasound Guided Sclerotherapy: No     Vein Light Guided Sclerotherapy: Yes       Procedure End Time: 246pm    Post-procedural Education Given: Yes    LOC at Discharge: AAOx4    Discharge Time: 300pm    MD: Susannah Martinez  NURSE: ANTHONY Sifuentes / CINDY So    " Chronic condition.  Restarted atorvastatin 20 mg.  Cholesterol profile recently elevated.  Was not taking the medication.  Abnormal CT calcium score.

## 2023-03-03 LAB
1OH-MIDAZOLAM UR QL SCN: NOT DETECTED
6MAM UR QL: NOT DETECTED
7AMINOCLONAZEPAM UR QL: NOT DETECTED
A-OH ALPRAZ UR QL: NOT DETECTED
ALPRAZ UR QL: NOT DETECTED
AMPHET UR QL SCN: NOT DETECTED
ANNOTATION COMMENT IMP: NORMAL
ANNOTATION COMMENT IMP: NORMAL
BARBITURATES UR QL: NOT DETECTED
BUPRENORPHINE UR QL: NOT DETECTED
BZE UR QL: NOT DETECTED
CARBOXYTHC UR QL: NOT DETECTED
CARISOPRODOL UR QL: NOT DETECTED
CLONAZEPAM UR QL: NOT DETECTED
CODEINE UR QL: NOT DETECTED
DIAZEPAM UR QL: NOT DETECTED
ETHYL GLUCURONIDE UR QL: PRESENT
FENTANYL UR QL: NOT DETECTED
GABAPENTIN UR QL: NOT DETECTED
HYDROCODONE UR QL: NOT DETECTED
HYDROMORPHONE UR QL: NOT DETECTED
LORAZEPAM UR QL: NOT DETECTED
MDA UR QL: NOT DETECTED
MDEA UR QL: NOT DETECTED
MDMA UR QL: NOT DETECTED
MEPERIDINE UR QL: NOT DETECTED
METHADONE UR QL: NOT DETECTED
METHAMPHET UR QL: NOT DETECTED
MIDAZOLAM UR QL SCN: NOT DETECTED
MORPHINE UR QL: NOT DETECTED
NALOXONE UR QL SCN: NOT DETECTED
NORBUPRENORPHINE UR QL CFM: NOT DETECTED
NORDIAZEPAM UR QL: NOT DETECTED
NORFENTANYL UR QL: NOT DETECTED
NORHYDROCODONE UR QL CFM: NOT DETECTED
NOROXYCODONE UR QL CFM: NOT DETECTED
NOROXYMORPH CO100 Q0458: NOT DETECTED
OXAZEPAM UR QL: NOT DETECTED
OXYCODONE UR QL: NOT DETECTED
OXYMORPHONE UR QL: NOT DETECTED
PATHOLOGY STUDY: NORMAL
PCP UR QL: NOT DETECTED
PHENTERMINE UR QL: NOT DETECTED
PPAA UR QL: NOT DETECTED
PREGABALIN UR QL SCN: NOT DETECTED
SERVICE CMNT-IMP: NORMAL
TAPENADOL OSULF CO200 Q0473: NOT DETECTED
TAPENTADOL UR QL SCN: NOT DETECTED
TEMAZEPAM UR QL: NOT DETECTED
TRAMADOL UR QL: NOT DETECTED
ZOLPIDEM PHENYL-4-CARB UR QL SCN: NOT DETECTED
ZOLPIDEM UR QL: NOT DETECTED

## 2023-05-26 ENCOUNTER — APPOINTMENT (OUTPATIENT)
Dept: MEDICAL GROUP | Facility: MEDICAL CENTER | Age: 48
End: 2023-05-26
Payer: COMMERCIAL

## 2023-06-15 ENCOUNTER — APPOINTMENT (OUTPATIENT)
Dept: LAB | Facility: MEDICAL CENTER | Age: 48
End: 2023-06-15
Attending: PHYSICIAN ASSISTANT

## 2023-08-07 ENCOUNTER — APPOINTMENT (OUTPATIENT)
Dept: MEDICAL GROUP | Facility: MEDICAL CENTER | Age: 48
End: 2023-08-07
Payer: COMMERCIAL

## 2023-09-07 ENCOUNTER — APPOINTMENT (OUTPATIENT)
Dept: MEDICAL GROUP | Facility: MEDICAL CENTER | Age: 48
End: 2023-09-07
Payer: COMMERCIAL

## 2024-01-05 ENCOUNTER — OFFICE VISIT (OUTPATIENT)
Dept: MEDICAL GROUP | Facility: MEDICAL CENTER | Age: 49
End: 2024-01-05
Payer: COMMERCIAL

## 2024-01-05 ENCOUNTER — HOSPITAL ENCOUNTER (OUTPATIENT)
Facility: MEDICAL CENTER | Age: 49
End: 2024-01-05
Attending: PHYSICIAN ASSISTANT
Payer: COMMERCIAL

## 2024-01-05 VITALS — HEIGHT: 69 IN | WEIGHT: 254 LBS | BODY MASS INDEX: 37.62 KG/M2

## 2024-01-05 DIAGNOSIS — Z12.5 SCREENING PSA (PROSTATE SPECIFIC ANTIGEN): ICD-10-CM

## 2024-01-05 DIAGNOSIS — R39.198 DIFFICULTY URINATING: ICD-10-CM

## 2024-01-05 DIAGNOSIS — E66.9 OBESITY (BMI 35.0-39.9 WITHOUT COMORBIDITY): ICD-10-CM

## 2024-01-05 DIAGNOSIS — R73.03 PREDIABETES: ICD-10-CM

## 2024-01-05 DIAGNOSIS — Z12.11 SCREENING FOR COLORECTAL CANCER: ICD-10-CM

## 2024-01-05 DIAGNOSIS — K21.9 GASTROESOPHAGEAL REFLUX DISEASE WITHOUT ESOPHAGITIS: ICD-10-CM

## 2024-01-05 DIAGNOSIS — Z12.12 SCREENING FOR COLORECTAL CANCER: ICD-10-CM

## 2024-01-05 DIAGNOSIS — Z00.00 PREVENTATIVE HEALTH CARE: ICD-10-CM

## 2024-01-05 LAB
APPEARANCE UR: CLEAR
BILIRUB UR STRIP-MCNC: NEGATIVE MG/DL
COLOR UR AUTO: YELLOW
GLUCOSE UR STRIP.AUTO-MCNC: NEGATIVE MG/DL
KETONES UR STRIP.AUTO-MCNC: NEGATIVE MG/DL
LEUKOCYTE ESTERASE UR QL STRIP.AUTO: NEGATIVE
NITRITE UR QL STRIP.AUTO: NEGATIVE
PH UR STRIP.AUTO: 5.5 [PH] (ref 5–8)
PROT UR QL STRIP: NEGATIVE MG/DL
RBC UR QL AUTO: NEGATIVE
SP GR UR STRIP.AUTO: >=1.03
UROBILINOGEN UR STRIP-MCNC: NORMAL MG/DL

## 2024-01-05 PROCEDURE — 99214 OFFICE O/P EST MOD 30 MIN: CPT | Performed by: PHYSICIAN ASSISTANT

## 2024-01-05 PROCEDURE — 81002 URINALYSIS NONAUTO W/O SCOPE: CPT | Performed by: PHYSICIAN ASSISTANT

## 2024-01-05 PROCEDURE — 87086 URINE CULTURE/COLONY COUNT: CPT

## 2024-01-05 RX ORDER — SULFAMETHOXAZOLE AND TRIMETHOPRIM 800; 160 MG/1; MG/1
1 TABLET ORAL 2 TIMES DAILY
Qty: 14 TABLET | Refills: 0 | Status: SHIPPED | OUTPATIENT
Start: 2024-01-05 | End: 2024-01-12

## 2024-01-05 ASSESSMENT — FIBROSIS 4 INDEX: FIB4 SCORE: 0.61

## 2024-01-05 ASSESSMENT — PATIENT HEALTH QUESTIONNAIRE - PHQ9: CLINICAL INTERPRETATION OF PHQ2 SCORE: 0

## 2024-01-05 NOTE — ASSESSMENT & PLAN NOTE
This is a pleasant 48-year-old male planing of a 3-day history of difficulty urinating.  Some dysuria.  Some pain.  Denies any hematuria.  No penile discharge.  He is also due for labs.  Has an appointment in February with Dr. Higuera regarding his weight and his history of prediabetes.  He also is due for colon cancer screening.  He has a history of GERD and takes Nexium sometimes once or twice a day.  Symptoms are not well-controlled.

## 2024-01-05 NOTE — PROGRESS NOTES
"Subjective:   Antonio Fisher Caryl is a 48 y.o. male here today for difficulty urination and preventative health care.    Difficulty urinating  This is a pleasant 48-year-old male planing of a 3-day history of difficulty urinating.  Some dysuria.  Some pain.  Denies any hematuria.  No penile discharge.  He is also due for labs.  Has an appointment in February with Dr. Higuera regarding his weight and his history of prediabetes.  He also is due for colon cancer screening.  He has a history of GERD and takes Nexium sometimes once or twice a day.  Symptoms are not well-controlled.       Current medicines (including changes today)  Current Outpatient Medications   Medication Sig Dispense Refill    sulfamethoxazole-trimethoprim (BACTRIM DS) 800-160 MG tablet Take 1 Tablet by mouth 2 times a day for 7 days. 14 Tablet 0    Semaglutide,0.25 or 0.5MG/DOS, 2 MG/1.5ML Solution Pen-injector Inject 0.25 mg under the skin every 7 days. 1.5 mL 1    fexofenadine (ALLEGRA) 180 MG tablet Take 1 Tab by mouth every day. 30 Tab 3    esomeprazole (NEXIUM) 20 MG capsule Take 1 Cap by mouth every day. 1/2 HOUR PRIOR TO SAME MEAL DAILY 30 Cap 5     No current facility-administered medications for this visit.     He  has a past medical history of GERD (gastroesophageal reflux disease).    Social History and Family History were reviewed and updated.    ROS   No chest pain, no shortness of breath, no abdominal pain and all other systems were reviewed and are negative.       Objective:     Ht 1.753 m (5' 9\")   Wt 115 kg (254 lb)  Body mass index is 37.51 kg/m².   Physical Exam:  Constitutional: Alert, no distress.  Skin: Warm, dry, good turgor, no rashes in visible areas.  Eye: Equal, round and reactive, conjunctiva clear, lids normal.  ENMT: Lips without lesions, good dentition, oropharynx clear.  Neck: Trachea midline, no masses.   Psych: Alert and oriented x3, normal affect and mood.    Urinalysis unremarkable.  Clear.  No leukocytes.  No " nitrates.  No blood.    Assessment and Plan:   The following treatment plan was discussed    1. Difficulty urinating  Acute, new onset condition.  Still dealing with intermittent symptoms.  Urine was sent out for culture. Treated with Bactrim until culture returns.  - POCT Urinalysis  - URINE CULTURE(NEW); Future  - sulfamethoxazole-trimethoprim (BACTRIM DS) 800-160 MG tablet; Take 1 Tablet by mouth 2 times a day for 7 days.  Dispense: 14 Tablet; Refill: 0    2. Morbid obesity due to excess calories (HCC)  Chronic condition.  He has an appointment in February with [facility].  Referral done.  - Referral to Endocrinology    3. Prediabetes  Chronic condition.  Status unknown.  Order labs.  Referred to Endo.  - Referral to Endocrinology    4. Gastroesophageal reflux disease without esophagitis  Chronic condition.  Uncontrolled.  Continue Nexium as directed.  Refer to gastro at [facility].  - Referral to Gastroenterology    5. Preventative health care  Order labs.  Fast 8 hours.  - CBC WITH DIFFERENTIAL; Future  - Comp Metabolic Panel; Future  - HEMOGLOBIN A1C; Future  - Lipid Profile; Future  - TSH WITH REFLEX TO FT4; Future  - VITAMIN D,25 HYDROXY (DEFICIENCY); Future    6. Screening PSA (prostate specific antigen)  PSA ordered.  Screening.  - PROSTATE SPECIFIC AG SCREENING; Future    7. Screening for colorectal cancer  Referred to GI at [facility] for colon cancer screening.  - Referral to Gastroenterology         Followup: Return in about 6 months (around 7/5/2024), or if symptoms worsen or fail to improve.    Please note that this dictation was created using voice recognition software. I have made every reasonable attempt to correct obvious errors, but I expect that there are errors of grammar and possibly content that I did not discover before finalizing the note.

## 2024-01-07 LAB
BACTERIA UR CULT: NORMAL
SIGNIFICANT IND 70042: NORMAL
SITE SITE: NORMAL
SOURCE SOURCE: NORMAL

## 2024-01-08 ENCOUNTER — HOSPITAL ENCOUNTER (OUTPATIENT)
Dept: LAB | Facility: MEDICAL CENTER | Age: 49
End: 2024-01-08
Attending: PHYSICIAN ASSISTANT
Payer: COMMERCIAL

## 2024-01-08 DIAGNOSIS — Z12.5 SCREENING PSA (PROSTATE SPECIFIC ANTIGEN): ICD-10-CM

## 2024-01-08 DIAGNOSIS — Z00.00 PREVENTATIVE HEALTH CARE: ICD-10-CM

## 2024-01-08 LAB
25(OH)D3 SERPL-MCNC: 18 NG/ML (ref 30–100)
ALBUMIN SERPL BCP-MCNC: 4.7 G/DL (ref 3.2–4.9)
ALBUMIN/GLOB SERPL: 1.7 G/DL
ALP SERPL-CCNC: 89 U/L (ref 30–99)
ALT SERPL-CCNC: 51 U/L (ref 2–50)
ANION GAP SERPL CALC-SCNC: 12 MMOL/L (ref 7–16)
AST SERPL-CCNC: 25 U/L (ref 12–45)
BASOPHILS # BLD AUTO: 0.7 % (ref 0–1.8)
BASOPHILS # BLD: 0.05 K/UL (ref 0–0.12)
BILIRUB SERPL-MCNC: 1.7 MG/DL (ref 0.1–1.5)
BUN SERPL-MCNC: 13 MG/DL (ref 8–22)
CALCIUM ALBUM COR SERPL-MCNC: 8.9 MG/DL (ref 8.5–10.5)
CALCIUM SERPL-MCNC: 9.5 MG/DL (ref 8.4–10.2)
CHLORIDE SERPL-SCNC: 102 MMOL/L (ref 96–112)
CHOLEST SERPL-MCNC: 202 MG/DL (ref 100–199)
CO2 SERPL-SCNC: 25 MMOL/L (ref 20–33)
CREAT SERPL-MCNC: 0.97 MG/DL (ref 0.5–1.4)
EOSINOPHIL # BLD AUTO: 0.07 K/UL (ref 0–0.51)
EOSINOPHIL NFR BLD: 1 % (ref 0–6.9)
ERYTHROCYTE [DISTWIDTH] IN BLOOD BY AUTOMATED COUNT: 40.7 FL (ref 35.9–50)
EST. AVERAGE GLUCOSE BLD GHB EST-MCNC: 117 MG/DL
FASTING STATUS PATIENT QL REPORTED: NORMAL
GFR SERPLBLD CREATININE-BSD FMLA CKD-EPI: 96 ML/MIN/1.73 M 2
GLOBULIN SER CALC-MCNC: 2.8 G/DL (ref 1.9–3.5)
GLUCOSE SERPL-MCNC: 105 MG/DL (ref 65–99)
HBA1C MFR BLD: 5.7 % (ref 4–5.6)
HCT VFR BLD AUTO: 48.3 % (ref 42–52)
HDLC SERPL-MCNC: 49 MG/DL
HGB BLD-MCNC: 15.8 G/DL (ref 14–18)
IMM GRANULOCYTES # BLD AUTO: 0.03 K/UL (ref 0–0.11)
IMM GRANULOCYTES NFR BLD AUTO: 0.4 % (ref 0–0.9)
LDLC SERPL CALC-MCNC: 127 MG/DL
LYMPHOCYTES # BLD AUTO: 1.78 K/UL (ref 1–4.8)
LYMPHOCYTES NFR BLD: 25.6 % (ref 22–41)
MCH RBC QN AUTO: 28.3 PG (ref 27–33)
MCHC RBC AUTO-ENTMCNC: 32.7 G/DL (ref 32.3–36.5)
MCV RBC AUTO: 86.4 FL (ref 81.4–97.8)
MONOCYTES # BLD AUTO: 0.51 K/UL (ref 0–0.85)
MONOCYTES NFR BLD AUTO: 7.3 % (ref 0–13.4)
NEUTROPHILS # BLD AUTO: 4.51 K/UL (ref 1.82–7.42)
NEUTROPHILS NFR BLD: 65 % (ref 44–72)
NRBC # BLD AUTO: 0 K/UL
NRBC BLD-RTO: 0 /100 WBC (ref 0–0.2)
PLATELET # BLD AUTO: 249 K/UL (ref 164–446)
PMV BLD AUTO: 10.5 FL (ref 9–12.9)
POTASSIUM SERPL-SCNC: 4.5 MMOL/L (ref 3.6–5.5)
PROT SERPL-MCNC: 7.5 G/DL (ref 6–8.2)
PSA SERPL-MCNC: 0.39 NG/ML (ref 0–4)
RBC # BLD AUTO: 5.59 M/UL (ref 4.7–6.1)
SODIUM SERPL-SCNC: 139 MMOL/L (ref 135–145)
TRIGL SERPL-MCNC: 128 MG/DL (ref 0–149)
TSH SERPL DL<=0.005 MIU/L-ACNC: 1.45 UIU/ML (ref 0.38–5.33)
WBC # BLD AUTO: 7 K/UL (ref 4.8–10.8)

## 2024-01-08 PROCEDURE — 84153 ASSAY OF PSA TOTAL: CPT

## 2024-01-08 PROCEDURE — 83036 HEMOGLOBIN GLYCOSYLATED A1C: CPT

## 2024-01-08 PROCEDURE — 36415 COLL VENOUS BLD VENIPUNCTURE: CPT

## 2024-01-08 PROCEDURE — 80061 LIPID PANEL: CPT

## 2024-01-08 PROCEDURE — 80053 COMPREHEN METABOLIC PANEL: CPT

## 2024-01-08 PROCEDURE — 85025 COMPLETE CBC W/AUTO DIFF WBC: CPT

## 2024-01-08 PROCEDURE — 82306 VITAMIN D 25 HYDROXY: CPT

## 2024-01-08 PROCEDURE — 84443 ASSAY THYROID STIM HORMONE: CPT

## 2024-01-10 ENCOUNTER — APPOINTMENT (OUTPATIENT)
Dept: MEDICAL GROUP | Facility: MEDICAL CENTER | Age: 49
End: 2024-01-10
Payer: COMMERCIAL

## 2024-01-17 ENCOUNTER — PATIENT MESSAGE (OUTPATIENT)
Dept: MEDICAL GROUP | Facility: MEDICAL CENTER | Age: 49
End: 2024-01-17
Payer: COMMERCIAL

## 2024-01-17 DIAGNOSIS — K21.9 GASTROESOPHAGEAL REFLUX DISEASE WITHOUT ESOPHAGITIS: ICD-10-CM

## 2024-04-19 ENCOUNTER — HOSPITAL ENCOUNTER (OUTPATIENT)
Dept: LAB | Facility: MEDICAL CENTER | Age: 49
End: 2024-04-19
Attending: PHYSICIAN ASSISTANT
Payer: COMMERCIAL

## 2024-04-19 LAB
BASOPHILS # BLD AUTO: 0.6 % (ref 0–1.8)
BASOPHILS # BLD: 0.05 K/UL (ref 0–0.12)
EOSINOPHIL # BLD AUTO: 0.08 K/UL (ref 0–0.51)
EOSINOPHIL NFR BLD: 1 % (ref 0–6.9)
ERYTHROCYTE [DISTWIDTH] IN BLOOD BY AUTOMATED COUNT: 43.1 FL (ref 35.9–50)
ESTRADIOL SERPL-MCNC: 46.3 PG/ML
HCT VFR BLD AUTO: 49.8 % (ref 42–52)
HGB BLD-MCNC: 16.2 G/DL (ref 14–18)
IMM GRANULOCYTES # BLD AUTO: 0.03 K/UL (ref 0–0.11)
IMM GRANULOCYTES NFR BLD AUTO: 0.4 % (ref 0–0.9)
LYMPHOCYTES # BLD AUTO: 1.74 K/UL (ref 1–4.8)
LYMPHOCYTES NFR BLD: 22 % (ref 22–41)
MCH RBC QN AUTO: 28 PG (ref 27–33)
MCHC RBC AUTO-ENTMCNC: 32.5 G/DL (ref 32.3–36.5)
MCV RBC AUTO: 86 FL (ref 81.4–97.8)
MONOCYTES # BLD AUTO: 0.5 K/UL (ref 0–0.85)
MONOCYTES NFR BLD AUTO: 6.3 % (ref 0–13.4)
NEUTROPHILS # BLD AUTO: 5.51 K/UL (ref 1.82–7.42)
NEUTROPHILS NFR BLD: 69.7 % (ref 44–72)
NRBC # BLD AUTO: 0 K/UL
NRBC BLD-RTO: 0 /100 WBC (ref 0–0.2)
PLATELET # BLD AUTO: 246 K/UL (ref 164–446)
PMV BLD AUTO: 11.9 FL (ref 9–12.9)
RBC # BLD AUTO: 5.79 M/UL (ref 4.7–6.1)
TESTOST SERPL-MCNC: 919 NG/DL (ref 175–781)
WBC # BLD AUTO: 7.9 K/UL (ref 4.8–10.8)

## 2024-04-19 PROCEDURE — 82670 ASSAY OF TOTAL ESTRADIOL: CPT

## 2024-04-19 PROCEDURE — 85025 COMPLETE CBC W/AUTO DIFF WBC: CPT

## 2024-04-19 PROCEDURE — 84270 ASSAY OF SEX HORMONE GLOBUL: CPT

## 2024-04-19 PROCEDURE — 84153 ASSAY OF PSA TOTAL: CPT

## 2024-04-19 PROCEDURE — 84154 ASSAY OF PSA FREE: CPT

## 2024-04-19 PROCEDURE — 84403 ASSAY OF TOTAL TESTOSTERONE: CPT

## 2024-04-19 PROCEDURE — 82642 DIHYDROTESTOSTERONE: CPT

## 2024-04-19 PROCEDURE — 36415 COLL VENOUS BLD VENIPUNCTURE: CPT

## 2024-04-21 ENCOUNTER — HOSPITAL ENCOUNTER (OUTPATIENT)
Dept: RADIOLOGY | Facility: MEDICAL CENTER | Age: 49
End: 2024-04-21
Payer: COMMERCIAL

## 2024-04-21 DIAGNOSIS — R74.8 ACID PHOSPHATASE ELEVATED: ICD-10-CM

## 2024-04-21 PROCEDURE — 76700 US EXAM ABDOM COMPLETE: CPT

## 2024-04-22 LAB
PSA FREE MFR SERPL: 13 %
PSA FREE SERPL-MCNC: 0.5 NG/ML
PSA SERPL-MCNC: 3.9 NG/ML (ref 0–4)
SHBG SERPL-SCNC: 28 NMOL/L (ref 17–56)

## 2024-04-25 LAB — ANDROSTANOLONE SERPL-MCNC: 379.4 PG/ML (ref 106–719)

## 2024-05-07 DIAGNOSIS — E78.00 PURE HYPERCHOLESTEROLEMIA: ICD-10-CM

## 2024-05-07 DIAGNOSIS — R73.03 PREDIABETES: ICD-10-CM

## 2024-05-07 DIAGNOSIS — R79.89 LOW TESTOSTERONE: ICD-10-CM

## 2024-08-22 NOTE — ED NOTES
Anesthesia Pre Eval Note    Anesthesia ROS/Med Hx    Overall Review:  EKG was reviewed, Echo was reviewed and Stress test was reviewed     Anesthetic Complication History:    Patient does not have a history of anesthetic complications      Pulmonary Review:    Positive for asthma  The patient is a former smoker.     Neuro/Psych Review:  Patient does not have a neuro/psych history         Cardiovascular Review:     Positive for hypertension  Positive for hyperlipidemia    GI/HEPATIC/RENAL Review:   Positive for hiatal hernia  Positive for GERD  Positive for bowel prep    End/Other Review:    Positive for obesity class I - 30.00 - 34.99  Positive for arthritis  Positive for chronic pain  Additional Results:  EKG:  No results found for this or any previous visit (from the past 4464 hour(s)). Echo:  No results found for: \"EF\"Stress Test:  No valid procedures specified.     ALLERGIES:   -- Brimonidine -- Other (See Comments)    --  Sensitivity burning of eye   -- Mercaptopurine -- Other (See Comments)    --  (increased LFT's             High liver enzymes   No results found for: \"WBC\", \"RBC\", \"HGB\", \"HCT\", \"MCV\", \"MCH\", \"MCHC\", \"RDWCV\", \"SODIUM\", \"POTASSIUM\", \"CHLORIDE\", \"CO2\", \"GLUCOSE\", \"BUN\", \"CREATININE\", \"GFRESTIMATE\", \"EGFRNONAFR\", \"GFRA\", \"GFRNA\", \"CALCIUM\", \"HCG\", \"PLT\", \"PTT\", \"INR\"   Past Medical History:  No date: Asthma (CMD)  No date: BMI 31.0-31.9,adult  No date: Bronchitis  08/20/2024: Chronic back pain      Comment:  Tramadol daily for pain.  No date: Chronic pain  No date: Colon polyps  No date: Crohn disease  (CMD)  No date: Eczema  No date: Essential (primary) hypertension  No date: Family history of Crohn's disease      Comment:  Brother  08/25/2019: Fractures      Comment:  Right foot bloom fracture, non-operative treatment  No date: Gastric polyps  No date: GERD (gastroesophageal reflux disease)  No date: Hiatal hernia  No date: HLD (hyperlipidemia)  No date: Lumbar radiculitis  No date:  COVID swab done and sent to lab  Pt requesting pain med  MD to re-evaluate   Prediabetes  No date: Tubular adenoma  No date: Ulcer  No date: Wears contact lenses  Past Surgical History:  1983, 1990: Abdomen surgery      Comment:  small bowel resection  12/09/2019: Carpal tunnel release; Right      Comment:  Dr. MILLY Dickinson   06/04/2014: Colonoscopy  08/14/2019: Colonoscopy  06/04/2014: Esophagogastroduodenoscopy  03/21/2024: Extracapsular cataract removal w insert io lens prosth wo   ecp; Right      Comment:  Dr Kristal Godinez  10/05/2023: Part remv vitreous,ant apprch; Right      Comment:  VITRECTOMY, USING 25-GAUGE INSTRUMENTS / ENDOLASER / GAS               FLUID EXCHANGE - Dr. Jimenez   Prior to Admission medications :  Medication albuterol 108 (90 Base) MCG/ACT inhaler, Sig Use 1-2 puffs every 4-6 hrs as needed with spacer, Start Date 6/25/24, End Date , Taking? Yes, Authorizing Provider Darci Fernández MD    Medication clobetasol (TEMOVATE) 0.05 % ointment, Sig , Start Date , End Date , Taking? Yes, Authorizing Provider Provider, Outside    Medication rosuvastatin (CRESTOR) 20 MG tablet, Sig Take 1 tablet by mouth nightly., Start Date 1/29/24, End Date 8/20/24, Taking? Yes, Authorizing Provider Provider, Outside    Medication lisinopril (ZESTRIL) 20 MG tablet, Sig Take 20 mg by mouth in the morning and 20 mg in the evening., Start Date 6/2/21, End Date , Taking? Yes, Authorizing Provider Provider, Outside    Medication omeprazole (PriLOSEC) 40 MG capsule, Sig Take 40 mg by mouth at bedtime., Start Date 11/12/20, End Date , Taking? Yes, Authorizing Provider Provider, Outside    Medication cetirizine (ZyrTEC) 10 MG tablet, Sig Take 10 mg by mouth nightly., Start Date , End Date , Taking? Yes, Authorizing Provider Provider, Outside    Medication fluticasone (FLONASE) 50 MCG/ACT nasal spray, Sig Spray 2 sprays in each nostril daily., Start Date 3/19/19, End Date , Taking? Yes, Authorizing Provider Darci Fernández MD    Medication Cholecalciferol (VITAMIN D3) 3000 UNITS Tab, Sig  Take 150 mcg by mouth daily. , Start Date , End Date , Taking? Yes, Authorizing Provider Provider, Outside    Medication traMADOL (ULTRAM) 50 MG tablet, Sig Take 50 mg by mouth every 6 hours as needed., Start Date , End Date , Taking? Yes, Authorizing Provider Provider, Outside    Medication Sodium Sulfate-Mag Sulfate-KCl (Sutab) 6129-560-283 MG Tab, Sig Take 3 tablets by mouth 4 times daily. Take 3 tablets by mouth 4 times daily. Take 3 tablets at 6:00 pm, 6:15 pm, 6:30 pm and 6:45 pm the night prior to the procedure. The morning of the procedure, 5 hours before procedure time, take 4 tabs every 15 minutes until remaining 12 tabs have been taken., Start Date 24, End Date , Taking? , Authorizing Provider Domenic Scott MD    Medication famotidine (PEPCID) 20 MG tablet, Sig Take 20 mg by mouth as needed., Start Date , End Date , Taking? , Authorizing Provider Provider, Outside    Medication Daily Multiple Vitamins TABS, Sig Take 1 tablet by mouth daily., Start Date , End Date , Taking? , Authorizing Provider Darci Fernández MD         No data found.  Social history reviewed:  Social History     Tobacco Use   Smoking Status Former   • Current packs/day: 0.00   • Average packs/day: 1 pack/day for 17.0 years (17.0 ttl pk-yrs)   • Types: Cigarettes   • Start date: 1982   • Quit date: 1999   • Years since quittin.9   Smokeless Tobacco Never        E-Cigarette/Vaping Substances & Devices   • E-Cigarette/Vaping Use Never Used        Social History     Substance and Sexual Activity   Alcohol Use Not Currently    Comment: Socially-Rarely. 1-2 times per month.         Relevant Problems   No relevant active problems       Physical Exam     Airway   Mallampati: II  TM Distance: >3 FB  Neck ROM: Full  Neck: Able to place in sniff position  TMJ Mobility: Good    Cardiovascular  Cardiovascular exam normal  Cardio Rhythm: Regular  Cardio Rate: Normal    Head Assessment  Head assessment: Normocephalic and  Atraumatic    General Assessment  General Assessment: Alert and oriented and No acute distress    Dental Exam  Dental exam normal    Pulmonary Exam  Pulmonary exam normal  Breath sounds clear to auscultation:  Yes  Patient Demonstrates:  Non-labored Breathing    Abdominal Exam  Abdominal exam normal  Patient Demonstrates:  Obese    Anesthesia Plan:    ASA Status: 3  Anesthesia Type: MAC    Preferred Airway Type: Nasal Cannula    Post-op Pain Management: Per Surgeon      Checklist  Reviewed: Nursing Notes, Consultations, Patient Summary, Medications, Allergies, NPO Status, DNR Status, Care Everywhere, Outside Records, Beta Blocker Status, Problem list, Past Med History, Lab Results, EKG, Chest X-Rays and Anesthesia Record  Consent/Risks Discussed Statement:  The proposed anesthetic plan, including its risks and benefits, have been discussed with the Patient along with the risks and benefits of alternatives. Questions were encouraged and answered and the patient and/or representative understands and agrees to proceed.        I discussed with the patient (and/or patient's legal representative) the risks and benefits of the proposed anesthesia plan, MAC, which may include services performed by other anesthesia providers.    Alternative anesthesia plans, if available, were reviewed with the patient (and/or patient's legal representative). Discussion has been held with the patient (and/or patient's legal representative) regarding risks of anesthesia, which include anxiety, dental injury, headache, hypotension, intra-operative awareness, nausea, sore throat, vomiting, allergic reaction, aspiration, emergence delirium and need for blood transfusion and emergent situations that may require change in anesthesia plan.    The patient (and/or patient's legal representative) has indicated understanding, his/her questions have been answered, and he/she wishes to proceed with the planned anesthetic.    Blood Products: Not  Anticipated

## 2024-12-14 ENCOUNTER — OFFICE VISIT (OUTPATIENT)
Dept: URGENT CARE | Facility: CLINIC | Age: 49
End: 2024-12-14
Payer: COMMERCIAL

## 2024-12-14 VITALS
TEMPERATURE: 97.3 F | BODY MASS INDEX: 33.8 KG/M2 | WEIGHT: 223 LBS | SYSTOLIC BLOOD PRESSURE: 118 MMHG | OXYGEN SATURATION: 97 % | HEIGHT: 68 IN | HEART RATE: 88 BPM | RESPIRATION RATE: 18 BRPM | DIASTOLIC BLOOD PRESSURE: 82 MMHG

## 2024-12-14 DIAGNOSIS — J02.9 PHARYNGITIS, UNSPECIFIED ETIOLOGY: ICD-10-CM

## 2024-12-14 DIAGNOSIS — R05.1 ACUTE COUGH: ICD-10-CM

## 2024-12-14 LAB
FLUAV RNA SPEC QL NAA+PROBE: NEGATIVE
FLUBV RNA SPEC QL NAA+PROBE: NEGATIVE
RSV RNA SPEC QL NAA+PROBE: NEGATIVE
S PYO DNA SPEC NAA+PROBE: NOT DETECTED
SARS-COV-2 RNA RESP QL NAA+PROBE: NEGATIVE

## 2024-12-14 PROCEDURE — 3074F SYST BP LT 130 MM HG: CPT | Performed by: PHYSICIAN ASSISTANT

## 2024-12-14 PROCEDURE — 99213 OFFICE O/P EST LOW 20 MIN: CPT | Performed by: PHYSICIAN ASSISTANT

## 2024-12-14 PROCEDURE — 0241U POCT CEPHEID COV-2, FLU A/B, RSV - PCR: CPT | Performed by: PHYSICIAN ASSISTANT

## 2024-12-14 PROCEDURE — 87651 STREP A DNA AMP PROBE: CPT | Performed by: PHYSICIAN ASSISTANT

## 2024-12-14 PROCEDURE — 3079F DIAST BP 80-89 MM HG: CPT | Performed by: PHYSICIAN ASSISTANT

## 2024-12-14 RX ORDER — PHENTERMINE HYDROCHLORIDE 37.5 MG/1
1 TABLET ORAL
COMMUNITY

## 2024-12-14 ASSESSMENT — ENCOUNTER SYMPTOMS
ABDOMINAL PAIN: 0
DIARRHEA: 0
SINUS PAIN: 1
NAUSEA: 0
SORE THROAT: 1
SHORTNESS OF BREATH: 0
MYALGIAS: 1
COUGH: 1
HEADACHES: 1
VOMITING: 0
FEVER: 0

## 2024-12-14 ASSESSMENT — FIBROSIS 4 INDEX: FIB4 SCORE: 0.71

## 2024-12-14 NOTE — PROGRESS NOTES
Subjective     AJ Natalia Hutson is a 49 y.o. male who presents with Sinusitis (Green phlem, R side of face pain, sinus pressure, x2 days)    HPI:  Antonio Hutson is a 49 y.o. male who presents today for evaluation of a possible sinus infection.  Severity of symptoms just about 2 days ago.  Says he has had pretty significant sinus pressure and congestion with green mucus.  Says he feels a mostly on the right side of his face.  He has been taking OTC medications such as Mucinex which does help a bit.  Symptoms not quite as severe today but he says he was not able to get out of bed yesterday because he was feeling unwell with bodyaches and fatigue.  He also reports cough that is sometimes productive of sputum.  No shortness of breath or chest pain.  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        Review of Systems   Constitutional:  Positive for malaise/fatigue. Negative for fever (no measured fever).   HENT:  Positive for congestion, ear pain, sinus pain and sore throat.    Respiratory:  Positive for cough. Negative for shortness of breath.    Cardiovascular:  Negative for chest pain.   Gastrointestinal:  Negative for abdominal pain, diarrhea, nausea and vomiting.   Musculoskeletal:  Positive for myalgias.   Neurological:  Positive for headaches.         PMH:  has a past medical history of GERD (gastroesophageal reflux disease).  MEDS:   Current Outpatient Medications:     phentermine (ADIPEX-P) 37.5 MG tablet, Take 1 Tablet by mouth every morning before breakfast., Disp: , Rfl:     esomeprazole (NEXIUM) 20 MG capsule, Take 1 Capsule by mouth every day for 360 days. 1/2 HOUR PRIOR TO SAME MEAL DAILY, Disp: 90 Capsule, Rfl: 3    Semaglutide,0.25 or 0.5MG/DOS, 2 MG/1.5ML Solution Pen-injector, Inject 0.25 mg under the skin every 7 days., Disp: 1.5 mL, Rfl: 1    fexofenadine (ALLEGRA) 180 MG tablet, Take 1 Tab by mouth every day.,  "Disp: 30 Tab, Rfl: 3  ALLERGIES: No Known Allergies  SURGHX:   Past Surgical History:   Procedure Laterality Date    ME DENTAL SURGERY PROCEDURE Right 7/10/2022    Procedure: EXTRACTION, TOOTH;  Surgeon: Clemente Lombardo D.D.S.;  Location: SURGERY [facility];  Service: Dental    SKIN ABSCESS INCISION AND DRAINAGE Right 7/10/2022    Procedure: INCISION AND DRAINAGE, ABSCESS, - Complicated Intraoral I&D;  Surgeon: Clemente Lombardo D.D.S.;  Location: SURGERY [facility];  Service: Dental     SOCHX:  reports that he has never smoked. He has never used smokeless tobacco. He reports current alcohol use. He reports that he does not use drugs.  FH: Family history was reviewed, no pertinent findings to report      Objective     /82 (BP Location: Left arm, Patient Position: Sitting, BP Cuff Size: Large adult)   Temp 36.3 °C (97.3 °F) (Temporal)   Resp 18   Ht 1.727 m (5' 8\")   Wt 101 kg (223 lb)   SpO2 97%   BMI 33.91 kg/m²      Physical Exam  Constitutional:       Appearance: He is well-developed.   HENT:      Head: Normocephalic and atraumatic.      Right Ear: Tympanic membrane, ear canal and external ear normal.      Left Ear: Tympanic membrane, ear canal and external ear normal.      Nose: Mucosal edema and congestion present. No rhinorrhea.      Right Sinus: No maxillary sinus tenderness or frontal sinus tenderness.      Left Sinus: No maxillary sinus tenderness or frontal sinus tenderness.      Mouth/Throat:      Lips: Pink.      Mouth: Mucous membranes are moist.      Pharynx: Uvula midline. Posterior oropharyngeal erythema present. No oropharyngeal exudate.   Eyes:      Conjunctiva/sclera: Conjunctivae normal.      Pupils: Pupils are equal, round, and reactive to light.   Cardiovascular:      Rate and Rhythm: Normal rate and regular rhythm.      Heart sounds: Normal heart sounds. No murmur heard.  Pulmonary:      Effort: Pulmonary effort is normal.      Breath sounds: Normal breath sounds. No wheezing. "   Musculoskeletal:      Cervical back: Normal range of motion.   Lymphadenopathy:      Cervical: No cervical adenopathy.   Skin:     General: Skin is warm and dry.      Capillary Refill: Capillary refill takes less than 2 seconds.   Neurological:      Mental Status: He is alert and oriented to person, place, and time.   Psychiatric:         Behavior: Behavior normal.         Judgment: Judgment normal.         POCT GROUP A STREP, PCR - Negative    POCT CoV-2, Flu A/B, RSV by PCR - Negative  Assessment & Plan        1. Pharyngitis, unspecified etiology  - POCT GROUP A STREP, PCR  - POCT CoV-2, Flu A/B, RSV by PCR  - amoxicillin-clavulanate (AUGMENTIN) 875-125 MG Tab; Take 1 Tablet by mouth 2 times a day for 5 days.  Dispense: 10 Tablet; Refill: 0    2. Acute cough  - POCT CoV-2, Flu A/B, RSV by PCR      PCR testing negative for strep, COVID, flu, and RSV.  He did self report a very close contact that needed antibiotic treatment for bacterial infection this past week, however.  I therefore did send over a 5-day course of Augmentin for him to try if he wants to or if his symptoms do not start to continue to improve after a few days.  Recommend that he continue to use OTC cold and cough medications as needed.  He can also utilize supportive care measures to include the use of saline nasal rinses, steam inhalation, use of a cool-mist humidifier in the bedroom at night.  Drink plenty of fluids, get plenty of rest, use OTC acetaminophen or ibuprofen as needed for fever.            Differential Diagnosis, natural history, and supportive care discussed. Return to the Urgent Care or follow up with your PCP if symptoms fail to resolve, or for any new or worsening symptoms. Emergency room precautions discussed. Patient and/or family appears understanding of information.

## 2025-03-13 ENCOUNTER — APPOINTMENT (OUTPATIENT)
Dept: URGENT CARE | Facility: CLINIC | Age: 50
End: 2025-03-13
Payer: COMMERCIAL

## 2025-03-24 ENCOUNTER — APPOINTMENT (OUTPATIENT)
Dept: URBAN - METROPOLITAN AREA CLINIC 15 | Facility: CLINIC | Age: 50
Setting detail: DERMATOLOGY
End: 2025-03-24

## 2025-03-24 DIAGNOSIS — L82.1 OTHER SEBORRHEIC KERATOSIS: ICD-10-CM

## 2025-03-24 DIAGNOSIS — Z71.89 OTHER SPECIFIED COUNSELING: ICD-10-CM

## 2025-03-24 DIAGNOSIS — L57.8 OTHER SKIN CHANGES DUE TO CHRONIC EXPOSURE TO NONIONIZING RADIATION: ICD-10-CM

## 2025-03-24 DIAGNOSIS — D485 NEOPLASM OF UNCERTAIN BEHAVIOR OF SKIN: ICD-10-CM

## 2025-03-24 DIAGNOSIS — L81.4 OTHER MELANIN HYPERPIGMENTATION: ICD-10-CM

## 2025-03-24 PROBLEM — D23.5 OTHER BENIGN NEOPLASM OF SKIN OF TRUNK: Status: ACTIVE | Noted: 2025-03-24

## 2025-03-24 PROBLEM — D48.5 NEOPLASM OF UNCERTAIN BEHAVIOR OF SKIN: Status: ACTIVE | Noted: 2025-03-24

## 2025-03-24 PROCEDURE — ? COUNSELING

## 2025-03-24 PROCEDURE — ? BIOPSY BY SHAVE METHOD

## 2025-03-24 PROCEDURE — ? ADDITIONAL NOTES

## 2025-03-24 PROCEDURE — 11102 TANGNTL BX SKIN SINGLE LES: CPT

## 2025-03-24 PROCEDURE — 99203 OFFICE O/P NEW LOW 30 MIN: CPT | Mod: 25

## 2025-03-24 PROCEDURE — ? SUNSCREEN RECOMMENDATIONS

## 2025-03-24 ASSESSMENT — LOCATION SIMPLE DESCRIPTION DERM
LOCATION SIMPLE: RIGHT UPPER BACK
LOCATION SIMPLE: RIGHT CHEEK
LOCATION SIMPLE: RIGHT SCALP
LOCATION SIMPLE: LEFT UPPER BACK

## 2025-03-24 ASSESSMENT — LOCATION ZONE DERM
LOCATION ZONE: SCALP
LOCATION ZONE: FACE
LOCATION ZONE: TRUNK

## 2025-03-24 ASSESSMENT — LOCATION DETAILED DESCRIPTION DERM
LOCATION DETAILED: RIGHT SUPERIOR CENTRAL MALAR CHEEK
LOCATION DETAILED: LEFT MEDIAL UPPER BACK
LOCATION DETAILED: RIGHT LATERAL MALAR CHEEK
LOCATION DETAILED: RIGHT MEDIAL FRONTAL SCALP
LOCATION DETAILED: RIGHT INFERIOR MEDIAL UPPER BACK

## 2025-03-24 NOTE — PROCEDURE: ADDITIONAL NOTES
Additional Notes: light brown HK macule/papule measuring 5mm\\nprobable SK/SL but cannot r/o melanocytic atypia\\ndiscussed bx, pt declined, notes not changing\\ntherefore, elected to observe for now.  Informed if ever changes to rtc and will bx.  otherwise will recheck again in 6mo. photo today.
Render Risk Assessment In Note?: no
Detail Level: Detailed

## 2025-06-09 ENCOUNTER — APPOINTMENT (OUTPATIENT)
Dept: MEDICAL GROUP | Facility: MEDICAL CENTER | Age: 50
End: 2025-06-09
Payer: COMMERCIAL

## 2025-06-17 ENCOUNTER — OFFICE VISIT (OUTPATIENT)
Dept: MEDICAL GROUP | Facility: MEDICAL CENTER | Age: 50
End: 2025-06-17
Payer: COMMERCIAL

## 2025-06-17 VITALS
WEIGHT: 238.1 LBS | BODY MASS INDEX: 36.09 KG/M2 | HEART RATE: 76 BPM | SYSTOLIC BLOOD PRESSURE: 136 MMHG | DIASTOLIC BLOOD PRESSURE: 82 MMHG | HEIGHT: 68 IN | TEMPERATURE: 97.9 F | OXYGEN SATURATION: 96 %

## 2025-06-17 DIAGNOSIS — Z00.00 PREVENTATIVE HEALTH CARE: ICD-10-CM

## 2025-06-17 DIAGNOSIS — Z12.5 ENCOUNTER FOR SCREENING PROSTATE SPECIFIC ANTIGEN (PSA) MEASUREMENT: ICD-10-CM

## 2025-06-17 DIAGNOSIS — R79.89 LOW VITAMIN D LEVEL: ICD-10-CM

## 2025-06-17 DIAGNOSIS — Z85.828 HISTORY OF SKIN CANCER: ICD-10-CM

## 2025-06-17 DIAGNOSIS — E66.9 OBESITY (BMI 35.0-39.9 WITHOUT COMORBIDITY): ICD-10-CM

## 2025-06-17 DIAGNOSIS — R79.89 LOW TESTOSTERONE: ICD-10-CM

## 2025-06-17 PROCEDURE — 3075F SYST BP GE 130 - 139MM HG: CPT | Performed by: PHYSICIAN ASSISTANT

## 2025-06-17 PROCEDURE — G2211 COMPLEX E/M VISIT ADD ON: HCPCS | Performed by: PHYSICIAN ASSISTANT

## 2025-06-17 PROCEDURE — 99214 OFFICE O/P EST MOD 30 MIN: CPT | Performed by: PHYSICIAN ASSISTANT

## 2025-06-17 PROCEDURE — 3079F DIAST BP 80-89 MM HG: CPT | Performed by: PHYSICIAN ASSISTANT

## 2025-06-17 RX ORDER — TIRZEPATIDE 5 MG/.5ML
5 INJECTION, SOLUTION SUBCUTANEOUS
Qty: 2 ML | Refills: 3 | Status: SHIPPED | OUTPATIENT
Start: 2025-06-17 | End: 2025-07-15

## 2025-06-17 ASSESSMENT — FIBROSIS 4 INDEX: FIB4 SCORE: 0.71

## 2025-06-17 ASSESSMENT — PATIENT HEALTH QUESTIONNAIRE - PHQ9: CLINICAL INTERPRETATION OF PHQ2 SCORE: 0

## 2025-06-17 NOTE — Clinical Note
REFERRAL APPROVAL NOTICE         Sent on June 17, 2025                   DEVONTE Fisher June  [address]  [place] [address]                   Dear  Caryl,    After a careful review of the medical information and benefit coverage, [facility] has processed your referral. See below for additional details.    If applicable, you must be actively enrolled with your insurance for coverage of the authorized service. If you have any questions regarding your coverage, please contact your insurance directly.    REFERRAL INFORMATION   Referral #:  [id number]  Referred-To Department    Referred-By Provider:  Dermatology    Bhupendra Hurtado P.A.-C.   [facility]      [address]  [address]  [place] [id number]  [phone] [address]  [place] [id number] [phone]    Referral Start Date:  06/17/2025  Referral End Date:   06/17/2026             SCHEDULING  If you do not already have an appointment, please call [phone] to make an appointment.     MORE INFORMATION  If you do not already have a [employer] account, sign up at: [email or web address]  You can access your medical information, make appointments, see lab results, billing information, and more.  If you have questions regarding this referral, please contact  the [facility] Referrals department at:             [phone]. Monday - Friday 8:00AM - 5:00PM.     Sincerely,    [facility]

## 2025-06-17 NOTE — PROGRESS NOTES
"Subjective:     History of Present Illness  The patient presents for evaluation of a mole on his back, weight management, and low testosterone.    He has requested a referral to dermatology due to a previous diagnosis of skin cancer, identified through a biopsy of a mole on his back. The specific type of cancer is currently unknown to him. He was advised to seek treatment at the [facility].    He has been using semaglutide for approximately 4 to 5 months, which has resulted in significant weight loss, increased energy levels, and improved stress management. Despite reducing his alcohol consumption and maintaining an active lifestyle, including walking 2 miles daily, he has been unable to lose weight. He also reports improved sleep quality when using semaglutide. He is interested in resuming this medication.    He has been taking testosterone pills, which have been effective in managing his symptoms. He has previously tried testosterone pellets, which were also beneficial, but he is seeking a more cost-effective solution for long-term maintenance.    He has run out of his vitamin D supplement and is interested in having his vitamin D levels checked.      Current medicines (including changes today)  Current Medications[1]  He  has a past medical history of GERD (gastroesophageal reflux disease).    ROS   No chest pain, no shortness of breath, no abdominal pain  Positive ROS as per HPI.  All other systems reviewed and are negative.     Objective:     /82   Pulse 76   Temp 36.6 °C (97.9 °F) (Temporal)   Ht 1.727 m (5' 8\")   Wt 108 kg (238 lb 1.6 oz)   SpO2 96%  Body mass index is 36.2 kg/m².   Physical Exam    Constitutional: Alert, no distress.  Skin: Warm, dry, good turgor, no rashes in visible areas.  Eye: Equal, round and reactive, conjunctiva clear, lids normal.  ENMT: Lips without lesions, good dentition, oropharynx clear.  Neck: Trachea midline, no masses, no thyromegaly.   Psych: Alert and " oriented x3, normal affect and mood.      Results  Diagnostic Testing   - Biopsy of mole on back: Positive for cancer per patient.  Unable to locate path report.        Assessment and Plan:   The following treatment plan was discussed    Assessment & Plan  1.  History of skin cancer.  - Referral to the [facility] initiated for further evaluation and management.  - Advised to contact the institute if not reached within 5 business days.  - Unable to locate any record on his chart.    2.  Obesity. Chronic condition.  - Prescription for Zepbound 5 mg injection provided with instructions to self-pay.  - Advised to administer the medication for a month and communicate via [employer] if dosage adjustment to 7.5 mg or 10 mg is needed.    3. Low testosterone.  - Comprehensive labs ordered including testosterone levels, vitamin D, complete blood count, liver function, kidney function, A1c, cholesterol panel, PSA, and thyroid function.  - Advised to fast for 8 hours prior to lab work and schedule tests between 7 and 9 AM.    4. Vitamin D deficiency.  - Advised to inform if renewal of vitamin D supplement at 50,000 units is needed.    5.  Preventative health care.  - Ordered labs.  Fast 8 hours.  Will perform through [facility].      ORDERS:  1. History of skin cancer    - Referral to Dermatology    2. Obesity (BMI 35.0-39.9 without comorbidity)    - Tirzepatide-Weight Management (ZEPBOUND) 5 MG/0.5ML Solution vial; Inject 0.5 mL under the skin every 7 days for 28 days.  Dispense: 2 mL; Refill: 3    3. Low testosterone    - Testosterone, Free & Total, Adult Male (w/SHBG); Future    4. Encounter for screening prostate specific antigen (PSA) measurement    - PROSTATE SPECIFIC AG SCREENING; Future    5. Preventative health care    - Testosterone, Free & Total, Adult Male (w/SHBG); Future  - VITAMIN D,25 HYDROXY (DEFICIENCY); Future  - CBC WITHOUT DIFFERENTIAL; Future  - Comp Metabolic Panel; Future  - HEMOGLOBIN A1C; Future  -  Lipid Profile; Future  - TSH WITH REFLEX TO FT4; Future    6. Low vitamin D level    - VITAMIN D,25 HYDROXY (DEFICIENCY); Future    () Today's E/M visit is associated with medical care services that serve as the continuing focal point for all needed health care services and/or with medical care services that are part of ongoing care related to a patient's single, serious condition or a complex condition: This includes furnishing services to patients on an ongoing basis that result in care that is personalized to the patient. The services result in a comprehensive, longitudinal, and continuous relationship with the patient and involve delivery of team-based care that is accessible, coordinated with other practitioners and providers, and integrated with the broader health care landscape.      Please note that this dictation was created using voice recognition software. I have made every reasonable attempt to correct obvious errors, but I expect that there are errors of grammar and possibly content that I did not discover before finalizing the note.      Attestation      Verbal consent was acquired by the patient to use [employer] ambient listening note generation during this visit Yes                  [1]   Current Outpatient Medications   Medication Sig Dispense Refill    Tirzepatide-Weight Management (ZEPBOUND) 5 MG/0.5ML Solution vial Inject 0.5 mL under the skin every 7 days for 28 days. 2 mL 3    Testosterone 25 MG PELLET by Implant route.      fexofenadine (ALLEGRA) 180 MG tablet Take 1 Tab by mouth every day. 30 Tab 3     No current facility-administered medications for this visit.

## 2025-07-21 ENCOUNTER — HOSPITAL ENCOUNTER (OUTPATIENT)
Dept: LAB | Facility: MEDICAL CENTER | Age: 50
End: 2025-07-21
Attending: PHYSICIAN ASSISTANT
Payer: COMMERCIAL

## 2025-07-21 DIAGNOSIS — Z00.00 PREVENTATIVE HEALTH CARE: ICD-10-CM

## 2025-07-21 DIAGNOSIS — Z12.5 ENCOUNTER FOR SCREENING PROSTATE SPECIFIC ANTIGEN (PSA) MEASUREMENT: ICD-10-CM

## 2025-07-21 DIAGNOSIS — R79.89 LOW TESTOSTERONE: ICD-10-CM

## 2025-07-21 DIAGNOSIS — R79.89 LOW VITAMIN D LEVEL: ICD-10-CM

## 2025-07-21 LAB
25(OH)D3 SERPL-MCNC: 28 NG/ML (ref 30–100)
ALBUMIN SERPL BCP-MCNC: 4.2 G/DL (ref 3.2–4.9)
ALBUMIN/GLOB SERPL: 1.8 G/DL
ALP SERPL-CCNC: 63 U/L (ref 30–99)
ALT SERPL-CCNC: 22 U/L (ref 2–50)
ANION GAP SERPL CALC-SCNC: 13 MMOL/L (ref 7–16)
AST SERPL-CCNC: 17 U/L (ref 12–45)
BILIRUB SERPL-MCNC: 0.4 MG/DL (ref 0.1–1.5)
BUN SERPL-MCNC: 15 MG/DL (ref 8–22)
CALCIUM ALBUM COR SERPL-MCNC: 9 MG/DL (ref 8.5–10.5)
CALCIUM SERPL-MCNC: 9.2 MG/DL (ref 8.5–10.5)
CHLORIDE SERPL-SCNC: 105 MMOL/L (ref 96–112)
CHOLEST SERPL-MCNC: 176 MG/DL (ref 100–199)
CO2 SERPL-SCNC: 21 MMOL/L (ref 20–33)
CREAT SERPL-MCNC: 1.07 MG/DL (ref 0.5–1.4)
ERYTHROCYTE [DISTWIDTH] IN BLOOD BY AUTOMATED COUNT: 42.2 FL (ref 35.9–50)
EST. AVERAGE GLUCOSE BLD GHB EST-MCNC: 117 MG/DL
FASTING STATUS PATIENT QL REPORTED: NORMAL
GFR SERPLBLD CREATININE-BSD FMLA CKD-EPI: 84 ML/MIN/1.73 M 2
GLOBULIN SER CALC-MCNC: 2.4 G/DL (ref 1.9–3.5)
GLUCOSE SERPL-MCNC: 100 MG/DL (ref 65–99)
HBA1C MFR BLD: 5.7 % (ref 4–5.6)
HCT VFR BLD AUTO: 46.1 % (ref 42–52)
HDLC SERPL-MCNC: 38 MG/DL
HGB BLD-MCNC: 14.7 G/DL (ref 14–18)
LDLC SERPL CALC-MCNC: 119 MG/DL
MCH RBC QN AUTO: 28.2 PG (ref 27–33)
MCHC RBC AUTO-ENTMCNC: 31.9 G/DL (ref 32.3–36.5)
MCV RBC AUTO: 88.3 FL (ref 81.4–97.8)
PLATELET # BLD AUTO: 216 K/UL (ref 164–446)
PMV BLD AUTO: 11.7 FL (ref 9–12.9)
POTASSIUM SERPL-SCNC: 4.5 MMOL/L (ref 3.6–5.5)
PROT SERPL-MCNC: 6.6 G/DL (ref 6–8.2)
PSA SERPL DL<=0.01 NG/ML-MCNC: 0.67 NG/ML (ref 0–4)
RBC # BLD AUTO: 5.22 M/UL (ref 4.7–6.1)
SODIUM SERPL-SCNC: 139 MMOL/L (ref 135–145)
TRIGL SERPL-MCNC: 96 MG/DL (ref 0–149)
TSH SERPL DL<=0.005 MIU/L-ACNC: 1.44 UIU/ML (ref 0.38–5.33)
WBC # BLD AUTO: 7 K/UL (ref 4.8–10.8)

## 2025-07-21 PROCEDURE — 36415 COLL VENOUS BLD VENIPUNCTURE: CPT

## 2025-07-21 PROCEDURE — 84153 ASSAY OF PSA TOTAL: CPT

## 2025-07-21 PROCEDURE — 84402 ASSAY OF FREE TESTOSTERONE: CPT

## 2025-07-21 PROCEDURE — 82306 VITAMIN D 25 HYDROXY: CPT

## 2025-07-21 PROCEDURE — 83036 HEMOGLOBIN GLYCOSYLATED A1C: CPT

## 2025-07-21 PROCEDURE — 80061 LIPID PANEL: CPT

## 2025-07-21 PROCEDURE — 84443 ASSAY THYROID STIM HORMONE: CPT

## 2025-07-21 PROCEDURE — 80053 COMPREHEN METABOLIC PANEL: CPT

## 2025-07-21 PROCEDURE — 84270 ASSAY OF SEX HORMONE GLOBUL: CPT

## 2025-07-21 PROCEDURE — 85027 COMPLETE CBC AUTOMATED: CPT

## 2025-07-21 PROCEDURE — 84403 ASSAY OF TOTAL TESTOSTERONE: CPT

## 2025-07-23 LAB
SHBG SERPL-SCNC: 49 NMOL/L (ref 19–76)
TESTOST FREE MFR SERPL: 1.6 % (ref 1.6–2.9)
TESTOST FREE SERPL-MCNC: 99 PG/ML (ref 47–244)
TESTOST SERPL-MCNC: 639 NG/DL (ref 300–890)

## (undated) DEVICE — LACTATED RINGERS INJ 1000 ML - (14EA/CA 60CA/PF)

## (undated) DEVICE — SUTURE GENERAL

## (undated) DEVICE — DRAPE SURGICAL U 77X120 - (10/CA)

## (undated) DEVICE — ELECTRODE 850 FOAM ADHESIVE - HYDROGEL RADIOTRNSPRNT (50/PK)

## (undated) DEVICE — SPONGE GAUZESTER 4 X 4 4PLY - (128PK/CA)

## (undated) DEVICE — TUBE CONNECTING SUCTION - CLEAR PLASTIC STERILE 72 IN (50EA/CA)

## (undated) DEVICE — TUBE, CULTURE AEROBIC

## (undated) DEVICE — CANISTER SUCTION RIGID RED 1500CC (40EA/CA)

## (undated) DEVICE — HEAD HOLDER JUNIOR/ADULT

## (undated) DEVICE — ELECTRODE DUAL RETURN W/ CORD - (50/PK)

## (undated) DEVICE — PROTECTOR ULNA NERVE - (36PR/CA)

## (undated) DEVICE — SUTURE 3-0 CHROMIC GUT SH 27 (36PK/BX)"

## (undated) DEVICE — BLADE SURGICAL #15 - (50/BX 3BX/CA)

## (undated) DEVICE — DRAPE LAPAROTOMY T SHEET - (12EA/CA)

## (undated) DEVICE — GLOVE, LITE (PAIR)

## (undated) DEVICE — SYRINGE 10 ML CONTROL LL (25EA/BX 4BX/CA)

## (undated) DEVICE — CANISTER SUCTION 3000ML MECHANICAL FILTER AUTO SHUTOFF MEDI-VAC NONSTERILE LF DISP  (40EA/CA)

## (undated) DEVICE — KIT ANESTHESIA W/CIRCUIT & 3/LT BAG W/FILTER (20EA/CA)

## (undated) DEVICE — NEEDLE NON SAFETY HYPO 22 GA X 1 1/2 IN (100/BX)

## (undated) DEVICE — SWAB ANAEROBIC SPEC.COLLECTOR - (25/PK 4PK/CA 100EA/CA)

## (undated) DEVICE — GLOVE BIOGEL SZ 8 SURGICAL PF LTX - (50PR/BX 4BX/CA)

## (undated) DEVICE — PACK MINOR BASIN - (2EA/CA)

## (undated) DEVICE — TOWEL STOP TIMEOUT SAFETY FLAG (40EA/CA)

## (undated) DEVICE — SUCTION INSTRUMENT YANKAUER BULBOUS TIP W/O VENT (50EA/CA)

## (undated) DEVICE — GLOVE BIOGEL SZ 7.5 SURGICAL PF LTX - (50PR/BX 4BX/CA)

## (undated) DEVICE — BLADE SURGICAL #11 - (50/BX)

## (undated) DEVICE — SODIUM CHL IRRIGATION 0.9% 1000ML (12EA/CA)

## (undated) DEVICE — MASK ANESTHESIA ADULT  - (100/CA)

## (undated) DEVICE — SENSOR OXIMETER ADULT SPO2 RD SET (20EA/BX)